# Patient Record
Sex: MALE | Race: WHITE | Employment: OTHER | URBAN - NONMETROPOLITAN AREA
[De-identification: names, ages, dates, MRNs, and addresses within clinical notes are randomized per-mention and may not be internally consistent; named-entity substitution may affect disease eponyms.]

---

## 2017-08-18 DIAGNOSIS — I10 ESSENTIAL HYPERTENSION: ICD-10-CM

## 2017-08-18 DIAGNOSIS — F32.A DEPRESSION, UNSPECIFIED DEPRESSION TYPE: ICD-10-CM

## 2017-08-18 RX ORDER — ENALAPRIL MALEATE 10 MG/1
10 TABLET ORAL 2 TIMES DAILY
Qty: 60 TABLET | Refills: 0 | Status: SHIPPED | OUTPATIENT
Start: 2017-08-18 | End: 2017-08-25 | Stop reason: SDUPTHER

## 2017-08-21 DIAGNOSIS — E03.9 HYPOTHYROIDISM, UNSPECIFIED TYPE: ICD-10-CM

## 2017-08-21 RX ORDER — LEVOTHYROXINE SODIUM 125 MCG
TABLET ORAL
Qty: 30 TABLET | OUTPATIENT
Start: 2017-08-21

## 2017-08-25 ENCOUNTER — OFFICE VISIT (OUTPATIENT)
Dept: FAMILY MEDICINE CLINIC | Age: 62
End: 2017-08-25
Payer: MEDICARE

## 2017-08-25 VITALS
WEIGHT: 238 LBS | DIASTOLIC BLOOD PRESSURE: 88 MMHG | RESPIRATION RATE: 14 BRPM | SYSTOLIC BLOOD PRESSURE: 138 MMHG | HEART RATE: 84 BPM | BODY MASS INDEX: 33.32 KG/M2 | HEIGHT: 71 IN

## 2017-08-25 DIAGNOSIS — I10 ESSENTIAL HYPERTENSION: ICD-10-CM

## 2017-08-25 DIAGNOSIS — Z00.00 ANNUAL PHYSICAL EXAM: Primary | ICD-10-CM

## 2017-08-25 DIAGNOSIS — R35.1 NOCTURIA: ICD-10-CM

## 2017-08-25 DIAGNOSIS — Z11.59 NEED FOR HEPATITIS C SCREENING TEST: ICD-10-CM

## 2017-08-25 DIAGNOSIS — Z23 NEED FOR PNEUMOCOCCAL VACCINATION: ICD-10-CM

## 2017-08-25 DIAGNOSIS — E03.9 HYPOTHYROIDISM, UNSPECIFIED TYPE: ICD-10-CM

## 2017-08-25 DIAGNOSIS — F32.A DEPRESSION, UNSPECIFIED DEPRESSION TYPE: ICD-10-CM

## 2017-08-25 PROCEDURE — G0009 ADMIN PNEUMOCOCCAL VACCINE: HCPCS | Performed by: FAMILY MEDICINE

## 2017-08-25 PROCEDURE — 99396 PREV VISIT EST AGE 40-64: CPT | Performed by: FAMILY MEDICINE

## 2017-08-25 PROCEDURE — 90732 PPSV23 VACC 2 YRS+ SUBQ/IM: CPT | Performed by: FAMILY MEDICINE

## 2017-08-25 RX ORDER — LEVOTHYROXINE SODIUM 0.12 MG/1
TABLET ORAL
Qty: 30 TABLET | Refills: 11 | Status: SHIPPED | OUTPATIENT
Start: 2017-08-25 | End: 2018-08-20 | Stop reason: SDUPTHER

## 2017-08-25 RX ORDER — ENALAPRIL MALEATE 10 MG/1
10 TABLET ORAL 2 TIMES DAILY
Qty: 60 TABLET | Refills: 11 | Status: SHIPPED | OUTPATIENT
Start: 2017-08-25 | End: 2018-08-27 | Stop reason: SDUPTHER

## 2017-08-25 RX ORDER — SPIRONOLACTONE 25 MG
TABLET ORAL
COMMUNITY

## 2017-08-25 RX ORDER — LACOSAMIDE 50 MG/1
50 TABLET ORAL 2 TIMES DAILY
COMMUNITY
End: 2018-08-27 | Stop reason: ALTCHOICE

## 2017-08-25 ASSESSMENT — PATIENT HEALTH QUESTIONNAIRE - PHQ9
2. FEELING DOWN, DEPRESSED OR HOPELESS: 1
SUM OF ALL RESPONSES TO PHQ9 QUESTIONS 1 & 2: 1
1. LITTLE INTEREST OR PLEASURE IN DOING THINGS: 0
SUM OF ALL RESPONSES TO PHQ QUESTIONS 1-9: 1

## 2017-08-25 ASSESSMENT — ENCOUNTER SYMPTOMS
EYE PAIN: 0
ABDOMINAL PAIN: 0
RHINORRHEA: 0
SORE THROAT: 0
CHEST TIGHTNESS: 0
COUGH: 0
BLOOD IN STOOL: 0
NAUSEA: 0
CONSTIPATION: 0
DIARRHEA: 0
BACK PAIN: 0
SHORTNESS OF BREATH: 0
WHEEZING: 0
VOMITING: 0

## 2017-08-30 ENCOUNTER — NURSE ONLY (OUTPATIENT)
Dept: FAMILY MEDICINE CLINIC | Age: 62
End: 2017-08-30
Payer: MEDICARE

## 2017-08-30 DIAGNOSIS — I10 ESSENTIAL HYPERTENSION: Primary | ICD-10-CM

## 2017-08-30 PROCEDURE — 36415 COLL VENOUS BLD VENIPUNCTURE: CPT | Performed by: FAMILY MEDICINE

## 2017-08-31 ENCOUNTER — TELEPHONE (OUTPATIENT)
Dept: FAMILY MEDICINE CLINIC | Age: 62
End: 2017-08-31

## 2017-08-31 LAB
ABSOLUTE BASO #: 0 K/UL (ref 0–0.1)
ABSOLUTE EOS #: 0.1 K/UL (ref 0.1–0.4)
ABSOLUTE LYMPH #: 1.7 K/UL (ref 0.8–5.2)
ABSOLUTE MONO #: 0.4 K/UL (ref 0.1–0.9)
ABSOLUTE NEUT #: 1.7 K/UL (ref 1.3–9.1)
ALBUMIN SERPL-MCNC: 4 G/DL (ref 3.2–5.3)
ALK PHOSPHATASE: 65 IU/L (ref 35–121)
ALT SERPL-CCNC: 19 IU/L (ref 5–59)
ANION GAP SERPL CALCULATED.3IONS-SCNC: 10 MMOL/L
AST SERPL-CCNC: 28 IU/L (ref 10–42)
BASOPHILS RELATIVE PERCENT: 0.5 %
BILIRUB SERPL-MCNC: 0.7 MG/DL (ref 0.2–1.3)
BUN BLDV-MCNC: 16 MG/DL (ref 10–20)
CALCIUM SERPL-MCNC: 9 MG/DL (ref 8.7–10.8)
CHLORIDE BLD-SCNC: 105 MMOL/L (ref 95–111)
CHOLESTEROL/HDL RATIO: 4.2
CHOLESTEROL: 101 MG/DL
CO2: 29 MMOL/L (ref 21–32)
CREAT SERPL-MCNC: 0.7 MG/DL (ref 0.5–1.3)
EGFR AFRICAN AMERICAN: 139
EGFR IF NONAFRICAN AMERICAN: 115
EOSINOPHILS RELATIVE PERCENT: 3.5 %
GLUCOSE: 86 MG/DL (ref 70–100)
HCT VFR BLD CALC: 41.8 % (ref 41.4–51)
HDLC SERPL-MCNC: 24 MG/DL (ref 40–60)
HEMOGLOBIN: 14.5 G/DL (ref 13.8–17)
HEPATITIS C ANTIBODY: NEGATIVE
LDL CHOLESTEROL CALCULATED: 13 MG/DL
LDL/HDL RATIO: 0.5
LYMPHOCYTE %: 43.4 %
MCH RBC QN AUTO: 28.9 PG (ref 27–34)
MCHC RBC AUTO-ENTMCNC: 34.7 G/DL (ref 31–36)
MCV RBC AUTO: 83.4 FL (ref 80–100)
MONOCYTES # BLD: 9 %
NEUTROPHILS RELATIVE PERCENT: 43.3 %
PDW BLD-RTO: 13.3 % (ref 10.8–14.8)
PLATELETS: 97 K/UL (ref 150–450)
POTASSIUM SERPL-SCNC: 3.7 MMOL/L (ref 3.5–5.4)
PSA, ULTRASENSITIVE: 0.32 NG/ML
RBC: 5.01 M/UL (ref 4–5.5)
SODIUM BLD-SCNC: 140 MMOL/L (ref 134–147)
T4 FREE: 1.02 NG/DL (ref 0.8–1.8)
TOTAL PROTEIN: 6.4 G/DL (ref 5.8–8)
TRIGL SERPL-MCNC: 318 MG/DL
TSH SERPL DL<=0.05 MIU/L-ACNC: <0.004 UIU/ML (ref 0.4–4.4)
VLDLC SERPL CALC-MCNC: 64 MG/DL
WBC: 4 K/UL (ref 3.7–10.8)

## 2018-04-05 ENCOUNTER — TELEPHONE (OUTPATIENT)
Dept: FAMILY MEDICINE CLINIC | Age: 63
End: 2018-04-05

## 2018-08-17 DIAGNOSIS — E03.9 HYPOTHYROIDISM, UNSPECIFIED TYPE: ICD-10-CM

## 2018-08-20 DIAGNOSIS — E03.9 HYPOTHYROIDISM, UNSPECIFIED TYPE: ICD-10-CM

## 2018-08-20 RX ORDER — LEVOTHYROXINE SODIUM 0.12 MG/1
TABLET ORAL
Qty: 30 TABLET | Refills: 0 | Status: SHIPPED | OUTPATIENT
Start: 2018-08-20 | End: 2018-08-27 | Stop reason: SDUPTHER

## 2018-08-20 RX ORDER — LEVOTHYROXINE SODIUM 125 MCG
TABLET ORAL
Qty: 30 TABLET | OUTPATIENT
Start: 2018-08-20

## 2018-08-20 NOTE — TELEPHONE ENCOUNTER
Patient's wife Arben Royal called to request a short term Rx for his Synthroid 125 mcg. He has an appt scheduled for his yearly check up and med refills on 8/27/18. He uses [facility].   Call her back at [phone] only if any problems

## 2018-08-27 ENCOUNTER — OFFICE VISIT (OUTPATIENT)
Dept: FAMILY MEDICINE CLINIC | Age: 63
End: 2018-08-27
Payer: MEDICARE

## 2018-08-27 VITALS
SYSTOLIC BLOOD PRESSURE: 138 MMHG | HEIGHT: 71 IN | RESPIRATION RATE: 12 BRPM | WEIGHT: 241.6 LBS | HEART RATE: 72 BPM | BODY MASS INDEX: 33.82 KG/M2 | DIASTOLIC BLOOD PRESSURE: 84 MMHG

## 2018-08-27 DIAGNOSIS — I10 ESSENTIAL HYPERTENSION: ICD-10-CM

## 2018-08-27 DIAGNOSIS — Z00.00 ANNUAL PHYSICAL EXAM: Primary | ICD-10-CM

## 2018-08-27 DIAGNOSIS — E03.9 HYPOTHYROIDISM, UNSPECIFIED TYPE: ICD-10-CM

## 2018-08-27 DIAGNOSIS — F32.A DEPRESSION, UNSPECIFIED DEPRESSION TYPE: ICD-10-CM

## 2018-08-27 DIAGNOSIS — Z83.49 FAMILY HISTORY OF HEMOCHROMATOSIS: ICD-10-CM

## 2018-08-27 DIAGNOSIS — R35.1 NOCTURIA: ICD-10-CM

## 2018-08-27 LAB
ALBUMIN SERPL-MCNC: 4 G/DL (ref 3.5–5.1)
ALP BLD-CCNC: 67 U/L (ref 38–126)
ALT SERPL-CCNC: 18 U/L (ref 11–66)
ANION GAP SERPL CALCULATED.3IONS-SCNC: 12 MEQ/L (ref 8–16)
AST SERPL-CCNC: 27 U/L (ref 5–40)
BASOPHILS # BLD: 0.5 %
BASOPHILS ABSOLUTE: 0 THOU/MM3 (ref 0–0.1)
BILIRUB SERPL-MCNC: 0.5 MG/DL (ref 0.3–1.2)
BUN BLDV-MCNC: 20 MG/DL (ref 7–22)
CALCIUM SERPL-MCNC: 9.1 MG/DL (ref 8.5–10.5)
CHLORIDE BLD-SCNC: 105 MEQ/L (ref 98–111)
CHOLESTEROL, TOTAL: 144 MG/DL (ref 100–199)
CO2: 25 MEQ/L (ref 23–33)
CREAT SERPL-MCNC: 0.7 MG/DL (ref 0.4–1.2)
EOSINOPHIL # BLD: 2 %
EOSINOPHILS ABSOLUTE: 0.1 THOU/MM3 (ref 0–0.4)
ERYTHROCYTE [DISTWIDTH] IN BLOOD BY AUTOMATED COUNT: 13 % (ref 11.5–14.5)
ERYTHROCYTE [DISTWIDTH] IN BLOOD BY AUTOMATED COUNT: 39 FL (ref 35–45)
FERRITIN: 309 NG/ML (ref 22–322)
GFR SERPL CREATININE-BSD FRML MDRD: > 90 ML/MIN/1.73M2
GLUCOSE BLD-MCNC: 101 MG/DL (ref 70–108)
HCT VFR BLD CALC: 42.4 % (ref 42–52)
HDLC SERPL-MCNC: 22 MG/DL
HEMOGLOBIN: 15.3 GM/DL (ref 14–18)
IMMATURE GRANS (ABS): 0.01 THOU/MM3 (ref 0–0.07)
IMMATURE GRANULOCYTES: 0.2 %
IRON: 62 UG/DL (ref 65–195)
LDL CHOLESTEROL CALCULATED: ABNORMAL MG/DL
LYMPHOCYTES # BLD: 42.5 %
LYMPHOCYTES ABSOLUTE: 1.7 THOU/MM3 (ref 1–4.8)
MCH RBC QN AUTO: 30.1 PG (ref 26–33)
MCHC RBC AUTO-ENTMCNC: 36.1 GM/DL (ref 32.2–35.5)
MCV RBC AUTO: 83.5 FL (ref 80–94)
MONOCYTES # BLD: 8.6 %
MONOCYTES ABSOLUTE: 0.4 THOU/MM3 (ref 0.4–1.3)
NUCLEATED RED BLOOD CELLS: 0 /100 WBC
PLATELET # BLD: 104 THOU/MM3 (ref 130–400)
PMV BLD AUTO: 11.6 FL (ref 9.4–12.4)
POTASSIUM SERPL-SCNC: 4 MEQ/L (ref 3.5–5.2)
PROSTATE SPECIFIC ANTIGEN: 0.35 NG/ML (ref 0–1)
RBC # BLD: 5.08 MILL/MM3 (ref 4.7–6.1)
SEG NEUTROPHILS: 46.2 %
SEGMENTED NEUTROPHILS ABSOLUTE COUNT: 1.9 THOU/MM3 (ref 1.8–7.7)
SODIUM BLD-SCNC: 142 MEQ/L (ref 135–145)
T4 FREE: 0.79 NG/DL (ref 0.93–1.76)
TOTAL IRON BINDING CAPACITY: 210 UG/DL (ref 171–450)
TOTAL PROTEIN: 7.1 G/DL (ref 6.1–8)
TRIGL SERPL-MCNC: 803 MG/DL (ref 0–199)
TSH SERPL DL<=0.05 MIU/L-ACNC: < 0.005 UIU/ML (ref 0.4–4.2)
WBC # BLD: 4.1 THOU/MM3 (ref 4.8–10.8)

## 2018-08-27 PROCEDURE — 36415 COLL VENOUS BLD VENIPUNCTURE: CPT | Performed by: FAMILY MEDICINE

## 2018-08-27 PROCEDURE — 99396 PREV VISIT EST AGE 40-64: CPT | Performed by: FAMILY MEDICINE

## 2018-08-27 RX ORDER — LEVOTHYROXINE SODIUM 0.12 MG/1
TABLET ORAL
Qty: 30 TABLET | Refills: 11 | Status: SHIPPED | OUTPATIENT
Start: 2018-08-27 | End: 2019-09-11 | Stop reason: SDUPTHER

## 2018-08-27 RX ORDER — OXCARBAZEPINE 300 MG/1
300 TABLET, FILM COATED ORAL 2 TIMES DAILY
COMMUNITY

## 2018-08-27 RX ORDER — MAGNESIUM OXIDE 400 MG/1
400 TABLET ORAL DAILY
COMMUNITY
End: 2022-09-08 | Stop reason: ALTCHOICE

## 2018-08-27 RX ORDER — ENALAPRIL MALEATE 10 MG/1
10 TABLET ORAL 2 TIMES DAILY
Qty: 60 TABLET | Refills: 11 | Status: SHIPPED | OUTPATIENT
Start: 2018-08-27 | End: 2019-09-11 | Stop reason: SDUPTHER

## 2018-08-27 ASSESSMENT — ENCOUNTER SYMPTOMS
COUGH: 0
VOMITING: 0
SHORTNESS OF BREATH: 0
EYE PAIN: 0
BLOOD IN STOOL: 0
BACK PAIN: 0
ABDOMINAL PAIN: 0
CONSTIPATION: 0
WHEEZING: 0
NAUSEA: 0
DIARRHEA: 0
CHEST TIGHTNESS: 0
SORE THROAT: 0
RHINORRHEA: 0

## 2018-08-27 ASSESSMENT — PATIENT HEALTH QUESTIONNAIRE - PHQ9
SUM OF ALL RESPONSES TO PHQ QUESTIONS 1-9: 0
SUM OF ALL RESPONSES TO PHQ QUESTIONS 1-9: 0
SUM OF ALL RESPONSES TO PHQ9 QUESTIONS 1 & 2: 0
2. FEELING DOWN, DEPRESSED OR HOPELESS: 0
1. LITTLE INTEREST OR PLEASURE IN DOING THINGS: 0

## 2018-08-27 NOTE — PROGRESS NOTES
no rebound and no guarding. Musculoskeletal: Normal range of motion. Right lower leg: He exhibits edema. Left lower leg: He exhibits edema. Lymphadenopathy:     He has no cervical adenopathy. Neurological: He is alert and oriented to person, place, and time. He has normal reflexes. No cranial nerve deficit. Skin: Skin is warm and dry. No rash noted. Psychiatric: He has a normal mood and affect. Nursing note and vitals reviewed.     Health Maintenance   Topic Date Due    HIV screen  09/10/1970    Shingles Vaccine (1 of 2 - 2 Dose Series) 04/16/2016    TSH testing  08/30/2018    Potassium monitoring  08/30/2018    Creatinine monitoring  08/30/2018    Flu vaccine (1) 09/01/2018    Colon cancer screen colonoscopy  12/03/2020    Lipid screen  08/30/2022    Hepatitis C screen  Completed     Lab Results   Component Value Date    CHOL 101 08/30/2017    CHOL 97 08/18/2016    CHOL 97 08/18/2016     Lab Results   Component Value Date    TRIG 318 (H) 08/30/2017    TRIG 331 (H) 08/18/2016    TRIG 331 08/18/2016     Lab Results   Component Value Date    HDL 24 (L) 08/30/2017    HDL 22 (L) 08/18/2016    HDL 22 (A) 08/18/2016     Lab Results   Component Value Date    LDLCALC 13 08/30/2017    LDLCALC 9 08/18/2016    LDLCALC 9 08/18/2016     Lab Results   Component Value Date    LABVLDL 64 (H) 08/30/2017    LABVLDL 66 (H) 08/18/2016    LABVLDL 74 (H) 11/12/2014    VLDL 66 08/18/2016    VLDL 74 11/12/2014     Lab Results   Component Value Date    CHOLHDLRATIO 4.2 08/30/2017    CHOLHDLRATIO 4.4 08/18/2016    CHOLHDLRATIO 4.4 08/18/2016     Lab Results   Component Value Date     08/30/2017    K 3.7 08/30/2017     08/30/2017    CO2 29 08/30/2017    BUN 16 08/30/2017    CREATININE 0.7 08/30/2017    GLUCOSE 86 08/30/2017    CALCIUM 9.0 08/30/2017    PROT 6.4 08/30/2017    LABALBU 4.0 08/30/2017    BILITOT 0.7 08/30/2017    ALKPHOS 65 08/30/2017    AST 28 08/30/2017    ALT 19 08/30/2017 Assessment:      Annual physical exam      Plan:      Refill meds  Fasting blood work  Encouraged diet, exercise and weight loss. Dash diet  Reviewed health maintenance    Ernie Martinez received counseling on the following healthy behaviors: nutrition, exercise and medication adherence    Patient given educational materials on Nutrition, Exercise and Hypertension    I have instructed Ernie Martinez to complete a self tracking handout on Blood Pressures  and Weights and instructed them to bring it with them to his next appointment. Discussed use, benefit, and side effects of prescribed medications. Barriers to medication compliance addressed. All patient questions answered. Pt voiced understanding.              Tong Suarez MD

## 2018-08-27 NOTE — PATIENT INSTRUCTIONS
heart attack and stroke. · Blood pressure. Have your blood pressure checked during a routine doctor visit. Your doctor will tell you how often to check your blood pressure based on your age, your blood pressure results, and other factors. · Prostate exam. Talk to your doctor about whether you should have a blood test (called a PSA test) for prostate cancer. Experts disagree on whether men should have this test. Some experts recommend that you discuss the benefits and risks of the test with your doctor. · Diabetes. Ask your doctor whether you should have tests for diabetes. · Vision. Some experts recommend that you have yearly exams for glaucoma and other age-related eye problems starting at age 48. · Hearing. Tell your doctor if you notice any change in your hearing. You can have tests to find out how well you hear. · Colon cancer. You should begin tests for colon cancer at age 48. You may have one of several tests. Your doctor will tell you how often to have tests based on your age and risk. Risks include whether you already had a precancerous polyp removed from your colon or whether your parent, brother, sister, or child has had colon cancer. · Heart attack and stroke risk. At least every 4 to 6 years, you should have your risk for heart attack and stroke assessed. Your doctor uses factors such as your age, blood pressure, cholesterol, and whether you smoke or have diabetes to show what your risk for a heart attack or stroke is over the next 10 years. · Abdominal aortic aneurysm. Ask your doctor whether you should have a test to check for an aneurysm. You may need a test if you ever smoked or if your parent, brother, sister, or child has had an aneurysm. When should you call for help? Watch closely for changes in your health, and be sure to contact your doctor if you have any problems or symptoms that concern you. Where can you learn more? Go to https://selena.health-partners. org and sign in to your [employer] account. Enter Y722 in the [facility] box to learn more about \"Well Visit, Men 48 to 72: Care Instructions. \"     If you do not have an account, please click on the \"Sign Up Now\" link. Current as of: Yue 10, 2017  Content Version: 11.7  © 0868-7226 [employer]. Care instructions adapted under license by [facility] ([facility]). If you have questions about a medical condition or this instruction, always ask your healthcare professional. [place] [address] any warranty or liability for your use of this information. Patient Education        Hypothyroidism: Care Instructions  Your Care Instructions    You have hypothyroidism, which means that your body is not making enough thyroid hormone. This hormone helps your body use energy. If your thyroid level is low, you may feel tired, be constipated, have an increase in your blood pressure, or have dry skin or memory problems. You may also get cold easily, even when it is warm. Women with low thyroid levels may have heavy menstrual periods. A blood test to find your thyroid-stimulating hormone (TSH) level is used to check for hypothyroidism. A high TSH level may mean that you have low thyroid. When your body is not making enough thyroid hormone, TSH levels rise in an effort to make the body produce more. The treatment for hypothyroidism is to take thyroid hormone pills. You should start to feel better in 1 to 2 weeks. But it can take several months to see changes in the TSH level. You will need regular visits with your doctor to make sure you have the right dose of medicine. Most people need treatment for the rest of their lives. You will need to see your doctor regularly to have blood tests and to make sure you are doing well. Follow-up care is a key part of your treatment and safety. Be sure to make and go to all appointments, and call your doctor if you are having problems.  It's also a good idea to know your test questions about a medical condition or this instruction, always ask your healthcare professional. [address] any warranty or liability for your use of this information. Patient Education        High Blood Pressure: Care Instructions  Your Care Instructions    If your blood pressure is usually above 130/80, you have high blood pressure, or hypertension. That means the top number is 130 or higher or the bottom number is 80 or higher, or both. Despite what a lot of people think, high blood pressure usually doesn't cause headaches or make you feel dizzy or lightheaded. It usually has no symptoms. But it does increase your risk for heart attack, stroke, and kidney or eye damage. The higher your blood pressure, the more your risk increases. Your doctor will give you a goal for your blood pressure. Your goal will be based on your health and your age. Lifestyle changes, such as eating healthy and being active, are always important to help lower blood pressure. You might also take medicine to reach your blood pressure goal.  Follow-up care is a key part of your treatment and safety. Be sure to make and go to all appointments, and call your doctor if you are having problems. It's also a good idea to know your test results and keep a list of the medicines you take. How can you care for yourself at home? Medical treatment  · If you stop taking your medicine, your blood pressure will go back up. You may take one or more types of medicine to lower your blood pressure. Be safe with medicines. Take your medicine exactly as prescribed. Call your doctor if you think you are having a problem with your medicine. · Talk to your doctor before you start taking aspirin every day. Aspirin can help certain people lower their risk of a heart attack or stroke. But taking aspirin isn't right for everyone, because it can cause serious bleeding. · See your doctor regularly.  You may need to see the doctor more often at first or until your blood pressure comes down. · If you are taking blood pressure medicine, talk to your doctor before you take decongestants or anti-inflammatory medicine, such as ibuprofen. Some of these medicines can raise blood pressure. · Learn how to check your blood pressure at home. Lifestyle changes  · Stay at a healthy weight. This is especially important if you put on weight around the waist. Losing even 10 pounds can help you lower your blood pressure. · If your doctor recommends it, get more exercise. Walking is a good choice. Bit by bit, increase the amount you walk every day. Try for at least 30 minutes on most days of the week. You also may want to swim, bike, or do other activities. · Avoid or limit alcohol. Talk to your doctor about whether you can drink any alcohol. · Try to limit how much sodium you eat to less than 2,300 milligrams (mg) a day. Your doctor may ask you to try to eat less than 1,500 mg a day. · Eat plenty of fruits (such as bananas and oranges), vegetables, legumes, whole grains, and low-fat dairy products. · Lower the amount of saturated fat in your diet. Saturated fat is found in animal products such as milk, cheese, and meat. Limiting these foods may help you lose weight and also lower your risk for heart disease. · Do not smoke. Smoking increases your risk for heart attack and stroke. If you need help quitting, talk to your doctor about stop-smoking programs and medicines. These can increase your chances of quitting for good. When should you call for help? Call 911 anytime you think you may need emergency care. This may mean having symptoms that suggest that your blood pressure is causing a serious heart or blood vessel problem. Your blood pressure may be over 180/110.   For example, call 911 if:    · You have symptoms of a heart attack. These may include:  ¨ Chest pain or pressure, or a strange feeling in the chest.  ¨ Sweating.   ¨ Shortness of breath. ¨ Nausea or vomiting. ¨ Pain, pressure, or a strange feeling in the back, neck, jaw, or upper belly or in one or both shoulders or arms. ¨ Lightheadedness or sudden weakness. ¨ A fast or irregular heartbeat.     · You have symptoms of a stroke. These may include:  ¨ Sudden numbness, tingling, weakness, or loss of movement in your face, arm, or leg, especially on only one side of your body. ¨ Sudden vision changes. ¨ Sudden trouble speaking. ¨ Sudden confusion or trouble understanding simple statements. ¨ Sudden problems with walking or balance. ¨ A sudden, severe headache that is different from past headaches.     · You have severe back or belly pain.    Do not wait until your blood pressure comes down on its own. Get help right away.   Call your doctor now or seek immediate care if:    · Your blood pressure is much higher than normal (such as 180/110 or higher), but you don't have symptoms.     · You think high blood pressure is causing symptoms, such as:  ¨ Severe headache. ¨ Blurry vision.    Watch closely for changes in your health, and be sure to contact your doctor if:    · Your blood pressure measures 140/90 or higher at least 2 times. That means the top number is 140 or higher or the bottom number is 90 or higher, or both.     · You think you may be having side effects from your blood pressure medicine.     · Your blood pressure is usually normal, but it goes above normal at least 2 times. Where can you learn more? Go to https://WiOffer.Dog Digital. org and sign in to your [employer] account. Enter E451 in the [employer]Mama box to learn more about \"High Blood Pressure: Care Instructions. \"     If you do not have an account, please click on the \"Sign Up Now\" link. Current as of: December 6, 2017  Content Version: 11.7  © 7267-6878 [employer]. Care instructions adapted under license by [facility] ([facility]).  If you have questions about a medical condition or this instruction, always ask your healthcare professional. [address] any warranty or liability for your use of this information. Patient Education        DASH Diet: Care Instructions  Your Care Instructions    The DASH diet is an eating plan that can help lower your blood pressure. DASH stands for Dietary Approaches to Stop Hypertension. Hypertension is high blood pressure. The DASH diet focuses on eating foods that are high in calcium, potassium, and magnesium. These nutrients can lower blood pressure. The foods that are highest in these nutrients are fruits, vegetables, low-fat dairy products, nuts, seeds, and legumes. But taking calcium, potassium, and magnesium supplements instead of eating foods that are high in those nutrients does not have the same effect. The DASH diet also includes whole grains, fish, and poultry. The DASH diet is one of several lifestyle changes your doctor may recommend to lower your high blood pressure. Your doctor may also want you to decrease the amount of sodium in your diet. Lowering sodium while following the DASH diet can lower blood pressure even further than just the DASH diet alone. Follow-up care is a key part of your treatment and safety. Be sure to make and go to all appointments, and call your doctor if you are having problems. It's also a good idea to know your test results and keep a list of the medicines you take. How can you care for yourself at home? Following the DASH diet  · Eat 4 to 5 servings of fruit each day. A serving is 1 medium-sized piece of fruit, ½ cup chopped or canned fruit, 1/4 cup dried fruit, or 4 ounces (½ cup) of fruit juice. Choose fruit more often than fruit juice. · Eat 4 to 5 servings of vegetables each day. A serving is 1 cup of lettuce or raw leafy vegetables, ½ cup of chopped or cooked vegetables, or 4 ounces (½ cup) of vegetable juice. Choose vegetables more often than vegetable juice.   · Get 2 to 3 servings of

## 2018-08-28 ENCOUNTER — TELEPHONE (OUTPATIENT)
Dept: FAMILY MEDICINE CLINIC | Age: 63
End: 2018-08-28

## 2018-08-28 DIAGNOSIS — E78.1 HYPERTRIGLYCERIDEMIA: Primary | ICD-10-CM

## 2018-08-28 NOTE — TELEPHONE ENCOUNTER
Pt and pt's wife notified of results and states he missed 4-5 doses of thyroid medication. Wife wants to discuss going on trig medication and will contact the office. She would like to  iron studies. Studies printed, release form attached - will come to the office to .

## 2018-09-07 RX ORDER — FENOFIBRATE 160 MG/1
160 TABLET ORAL DAILY
Qty: 30 TABLET | Refills: 3 | Status: SHIPPED | OUTPATIENT
Start: 2018-09-07 | End: 2018-11-15 | Stop reason: SDUPTHER

## 2018-11-14 ENCOUNTER — NURSE ONLY (OUTPATIENT)
Dept: FAMILY MEDICINE CLINIC | Age: 63
End: 2018-11-14
Payer: MEDICARE

## 2018-11-14 DIAGNOSIS — E78.1 HYPERTRIGLYCERIDEMIA: ICD-10-CM

## 2018-11-14 LAB
AST SERPL-CCNC: 31 U/L (ref 5–40)
TRIGL SERPL-MCNC: 160 MG/DL (ref 0–199)

## 2018-11-14 PROCEDURE — 36415 COLL VENOUS BLD VENIPUNCTURE: CPT | Performed by: FAMILY MEDICINE

## 2018-11-15 ENCOUNTER — TELEPHONE (OUTPATIENT)
Dept: FAMILY MEDICINE CLINIC | Age: 63
End: 2018-11-15

## 2018-11-15 DIAGNOSIS — E78.1 HYPERTRIGLYCERIDEMIA: ICD-10-CM

## 2018-11-15 RX ORDER — FENOFIBRATE 160 MG/1
160 TABLET ORAL DAILY
Qty: 30 TABLET | Refills: 9 | Status: SHIPPED | OUTPATIENT
Start: 2018-11-15 | End: 2019-09-11 | Stop reason: SDUPTHER

## 2019-09-11 ENCOUNTER — OFFICE VISIT (OUTPATIENT)
Dept: FAMILY MEDICINE CLINIC | Age: 64
End: 2019-09-11
Payer: MEDICARE

## 2019-09-11 VITALS
WEIGHT: 241 LBS | DIASTOLIC BLOOD PRESSURE: 80 MMHG | RESPIRATION RATE: 14 BRPM | HEART RATE: 88 BPM | HEIGHT: 70 IN | BODY MASS INDEX: 34.5 KG/M2 | SYSTOLIC BLOOD PRESSURE: 138 MMHG

## 2019-09-11 DIAGNOSIS — E78.1 HYPERTRIGLYCERIDEMIA: ICD-10-CM

## 2019-09-11 DIAGNOSIS — D64.9 ANEMIA, UNSPECIFIED TYPE: ICD-10-CM

## 2019-09-11 DIAGNOSIS — F32.A DEPRESSION, UNSPECIFIED DEPRESSION TYPE: ICD-10-CM

## 2019-09-11 DIAGNOSIS — R35.1 NOCTURIA: ICD-10-CM

## 2019-09-11 DIAGNOSIS — I10 ESSENTIAL HYPERTENSION: ICD-10-CM

## 2019-09-11 DIAGNOSIS — E03.9 HYPOTHYROIDISM, UNSPECIFIED TYPE: ICD-10-CM

## 2019-09-11 DIAGNOSIS — Z00.00 ANNUAL PHYSICAL EXAM: Primary | ICD-10-CM

## 2019-09-11 LAB
ALBUMIN SERPL-MCNC: 4.2 G/DL (ref 3.5–5.1)
ALP BLD-CCNC: 38 U/L (ref 38–126)
ALT SERPL-CCNC: 15 U/L (ref 11–66)
ANION GAP SERPL CALCULATED.3IONS-SCNC: 9 MEQ/L (ref 8–16)
AST SERPL-CCNC: 25 U/L (ref 5–40)
BASOPHILS # BLD: 0.7 %
BASOPHILS ABSOLUTE: 0 THOU/MM3 (ref 0–0.1)
BILIRUB SERPL-MCNC: 0.5 MG/DL (ref 0.3–1.2)
BUN BLDV-MCNC: 23 MG/DL (ref 7–22)
CALCIUM SERPL-MCNC: 9.4 MG/DL (ref 8.5–10.5)
CHLORIDE BLD-SCNC: 101 MEQ/L (ref 98–111)
CHOLESTEROL, TOTAL: 112 MG/DL (ref 100–199)
CO2: 28 MEQ/L (ref 23–33)
CREAT SERPL-MCNC: 0.8 MG/DL (ref 0.4–1.2)
EOSINOPHIL # BLD: 3.6 %
EOSINOPHILS ABSOLUTE: 0.1 THOU/MM3 (ref 0–0.4)
ERYTHROCYTE [DISTWIDTH] IN BLOOD BY AUTOMATED COUNT: 12.6 % (ref 11.5–14.5)
ERYTHROCYTE [DISTWIDTH] IN BLOOD BY AUTOMATED COUNT: 38.6 FL (ref 35–45)
GFR SERPL CREATININE-BSD FRML MDRD: > 90 ML/MIN/1.73M2
GLUCOSE BLD-MCNC: 94 MG/DL (ref 70–108)
HCT VFR BLD CALC: 39.6 % (ref 42–52)
HDLC SERPL-MCNC: 24 MG/DL
HEMOGLOBIN: 13.9 GM/DL (ref 14–18)
IMMATURE GRANS (ABS): 0.01 THOU/MM3 (ref 0–0.07)
IMMATURE GRANULOCYTES: 0 %
IRON: 96 UG/DL (ref 65–195)
LDL CHOLESTEROL CALCULATED: 56 MG/DL
LYMPHOCYTES # BLD: 41.4 %
LYMPHOCYTES ABSOLUTE: 1.3 THOU/MM3 (ref 1–4.8)
MCH RBC QN AUTO: 29.8 PG (ref 26–33)
MCHC RBC AUTO-ENTMCNC: 35.1 GM/DL (ref 32.2–35.5)
MCV RBC AUTO: 85 FL (ref 80–94)
MONOCYTES # BLD: 9.8 %
MONOCYTES ABSOLUTE: 0.3 THOU/MM3 (ref 0.4–1.3)
NUCLEATED RED BLOOD CELLS: 0 /100 WBC
PLATELET # BLD: 101 THOU/MM3 (ref 130–400)
PMV BLD AUTO: 11 FL (ref 9.4–12.4)
POTASSIUM SERPL-SCNC: 4 MEQ/L (ref 3.5–5.2)
PROSTATE SPECIFIC ANTIGEN: 0.64 NG/ML (ref 0–1)
RBC # BLD: 4.66 MILL/MM3 (ref 4.7–6.1)
SEG NEUTROPHILS: 44.2 %
SEGMENTED NEUTROPHILS ABSOLUTE COUNT: 1.4 THOU/MM3 (ref 1.8–7.7)
SODIUM BLD-SCNC: 138 MEQ/L (ref 135–145)
T4 FREE: 1.28 NG/DL (ref 0.93–1.76)
TOTAL PROTEIN: 7 G/DL (ref 6.1–8)
TRIGL SERPL-MCNC: 159 MG/DL (ref 0–199)
TSH SERPL DL<=0.05 MIU/L-ACNC: < 0.005 UIU/ML (ref 0.4–4.2)
WBC # BLD: 3.1 THOU/MM3 (ref 4.8–10.8)

## 2019-09-11 PROCEDURE — 99396 PREV VISIT EST AGE 40-64: CPT | Performed by: FAMILY MEDICINE

## 2019-09-11 PROCEDURE — 36415 COLL VENOUS BLD VENIPUNCTURE: CPT | Performed by: FAMILY MEDICINE

## 2019-09-11 RX ORDER — ENALAPRIL MALEATE 10 MG/1
10 TABLET ORAL 2 TIMES DAILY
Qty: 60 TABLET | Refills: 11 | Status: SHIPPED | OUTPATIENT
Start: 2019-09-11 | End: 2020-09-16 | Stop reason: SDUPTHER

## 2019-09-11 RX ORDER — LEVOTHYROXINE SODIUM 0.12 MG/1
TABLET ORAL
Qty: 30 TABLET | Refills: 11 | Status: SHIPPED | OUTPATIENT
Start: 2019-09-11 | End: 2020-09-16 | Stop reason: SDUPTHER

## 2019-09-11 RX ORDER — FENOFIBRATE 160 MG/1
160 TABLET ORAL DAILY
Qty: 30 TABLET | Refills: 11 | Status: SHIPPED | OUTPATIENT
Start: 2019-09-11 | End: 2020-09-16 | Stop reason: SDUPTHER

## 2019-09-11 ASSESSMENT — ENCOUNTER SYMPTOMS
DIARRHEA: 0
ABDOMINAL PAIN: 0
CONSTIPATION: 0
SHORTNESS OF BREATH: 0
SORE THROAT: 0
EYE PAIN: 0
WHEEZING: 0
CHEST TIGHTNESS: 0
VOMITING: 0
BLOOD IN STOOL: 0
BACK PAIN: 0
NAUSEA: 0
RHINORRHEA: 0
COUGH: 0

## 2019-09-11 NOTE — PATIENT INSTRUCTIONS
Instructions. \"     If you do not have an account, please click on the \"Sign Up Now\" link. Current as of: December 13, 2018  Content Version: 12.1  © 0470-8810 [employer]. Care instructions adapted under license by [facility] ([facility]). If you have questions about a medical condition or this instruction, always ask your healthcare professional. [address] any warranty or liability for your use of this information. Patient Education        Hypothyroidism: Care Instructions  Your Care Instructions    You have hypothyroidism, which means that your body is not making enough thyroid hormone. This hormone helps your body use energy. If your thyroid level is low, you may feel tired, be constipated, have an increase in your blood pressure, or have dry skin or memory problems. You may also get cold easily, even when it is warm. Women with low thyroid levels may have heavy menstrual periods. A blood test to find your thyroid-stimulating hormone (TSH) level is used to check for hypothyroidism. A high TSH level may mean that you have low thyroid. When your body is not making enough thyroid hormone, TSH levels rise in an effort to make the body produce more. The treatment for hypothyroidism is to take thyroid hormone pills. You should start to feel better in 1 to 2 weeks. But it can take several months to see changes in the TSH level. You will need regular visits with your doctor to make sure you have the right dose of medicine. Most people need treatment for the rest of their lives. You will need to see your doctor regularly to have blood tests and to make sure you are doing well. Follow-up care is a key part of your treatment and safety. Be sure to make and go to all appointments, and call your doctor if you are having problems. It's also a good idea to know your test results and keep a list of the medicines you take. How can you care for yourself at home?   · Take your thyroid hormone or both shoulders or arms. ? Lightheadedness or sudden weakness. ? A fast or irregular heartbeat.     · You have symptoms of a stroke. These may include:  ? Sudden numbness, tingling, weakness, or loss of movement in your face, arm, or leg, especially on only one side of your body. ? Sudden vision changes. ? Sudden trouble speaking. ? Sudden confusion or trouble understanding simple statements. ? Sudden problems with walking or balance. ? A sudden, severe headache that is different from past headaches.     · You have severe back or belly pain.    Do not wait until your blood pressure comes down on its own. Get help right away.   Call your doctor now or seek immediate care if:    · Your blood pressure is much higher than normal (such as 180/120 or higher), but you don't have symptoms.     · You think high blood pressure is causing symptoms, such as:  ? Severe headache.  ? Blurry vision.    Watch closely for changes in your health, and be sure to contact your doctor if:    · Your blood pressure measures higher than your doctor recommends at least 2 times. That means the top number is higher or the bottom number is higher, or both.     · You think you may be having side effects from your blood pressure medicine. Where can you learn more? Go to https://BluedpeHughes Telematics.Official [employer] Virtual. org and sign in to your [employer] account. Enter R619 in the Ring box to learn more about \"High Blood Pressure: Care Instructions. \"     If you do not have an account, please click on the \"Sign Up Now\" link. Current as of: July 22, 2018  Content Version: 12.1  © 4887-9827 [employer], [employer]. Care instructions adapted under license by [facility] [address] If you have questions about a medical condition or this instruction, always ask your healthcare professional. [place] [address] any warranty or liability for your use of this information.          Patient Education        High Cholesterol: Care Instructions  Your Care Instructions    Cholesterol is a type of fat in your blood. It is needed for many body functions, such as making new cells. Cholesterol is made by your body. It also comes from food you eat. High cholesterol means that you have too much of the fat in your blood. This raises your risk of a heart attack and stroke. LDL and HDL are part of your total cholesterol. LDL is the \"bad\" cholesterol. High LDL can raise your risk for heart disease, heart attack, and stroke. HDL is the \"good\" cholesterol. It helps clear bad cholesterol from the body. High HDL is linked with a lower risk of heart disease, heart attack, and stroke. Your cholesterol levels help your doctor find out your risk for having a heart attack or stroke. You and your doctor can talk about whether you need to lower your risk and what treatment is best for you. A heart-healthy lifestyle along with medicines can help lower your cholesterol and your risk. The way you choose to lower your risk will depend on how high your risk is for heart attack and stroke. It will also depend on how you feel about taking medicines. Follow-up care is a key part of your treatment and safety. Be sure to make and go to all appointments, and call your doctor if you are having problems. It's also a good idea to know your test results and keep a list of the medicines you take. How can you care for yourself at home? · Eat a variety of foods every day. Good choices include fruits, vegetables, whole grains (like oatmeal), dried beans and peas, nuts and seeds, soy products (like tofu), and fat-free or low-fat dairy products. · Replace butter, margarine, and hydrogenated or partially hydrogenated oils with olive and canola oils. (Canola oil margarine without trans fat is fine.)  · Replace red meat with fish, poultry, and soy protein (like tofu). · Limit processed and packaged foods like chips, crackers, and cookies. · Bake, broil, or steam foods.  Don't mares nutrients does not have the same effect. The DASH diet also includes whole grains, fish, and poultry. The DASH diet is one of several lifestyle changes your doctor may recommend to lower your high blood pressure. Your doctor may also want you to decrease the amount of sodium in your diet. Lowering sodium while following the DASH diet can lower blood pressure even further than just the DASH diet alone. Follow-up care is a key part of your treatment and safety. Be sure to make and go to all appointments, and call your doctor if you are having problems. It's also a good idea to know your test results and keep a list of the medicines you take. How can you care for yourself at home? Following the DASH diet  · Eat 4 to 5 servings of fruit each day. A serving is 1 medium-sized piece of fruit, ½ cup chopped or canned fruit, 1/4 cup dried fruit, or 4 ounces (½ cup) of fruit juice. Choose fruit more often than fruit juice. · Eat 4 to 5 servings of vegetables each day. A serving is 1 cup of lettuce or raw leafy vegetables, ½ cup of chopped or cooked vegetables, or 4 ounces (½ cup) of vegetable juice. Choose vegetables more often than vegetable juice. · Get 2 to 3 servings of low-fat and fat-free dairy each day. A serving is 8 ounces of milk, 1 cup of yogurt, or 1 ½ ounces of cheese. · Eat 6 to 8 servings of grains each day. A serving is 1 slice of bread, 1 ounce of dry cereal, or ½ cup of cooked rice, pasta, or cooked cereal. Try to choose whole-grain products as much as possible. · Limit lean meat, poultry, and fish to 2 servings each day. A serving is 3 ounces, about the size of a deck of cards. · Eat 4 to 5 servings of nuts, seeds, and legumes (cooked dried beans, lentils, and split peas) each week. A serving is 1/3 cup of nuts, 2 tablespoons of seeds, or ½ cup of cooked beans or peas. · Limit fats and oils to 2 to 3 servings each day.  A serving is 1 teaspoon of vegetable oil or 2 tablespoons of salad medical condition or this instruction, always ask your healthcare professional. [place] [address] any warranty or liability for your use of this information.

## 2019-09-11 NOTE — PROGRESS NOTES
Blood work drawn today in the office, venous puncture, right arm, pt tolerated well.
ALKPHOS 67 08/27/2018    AST 31 11/14/2018    ALT 18 08/27/2018    LABGLOM >90 08/27/2018           Assessment:      Annual physical exam      Plan:      Refill meds  Fasting blood work  Encouraged diet, exercise and weight loss. Dash diet  Reviewed health maintenance    Lucio Self received counseling on the following healthy behaviors: nutrition, exercise and hypertension    Patient given educational materials on Nutrition, Exercise and Hypertension    I have instructed Lucio Self to complete a self tracking handout on Blood Pressures  and Weights and instructed them to bring it with them to his next appointment. Discussed use, benefit, and side effects of prescribed medications. Barriers to medication compliance addressed. All patient questions answered. Pt voiced understanding.            Alida Lazaro MD

## 2019-09-12 ENCOUNTER — TELEPHONE (OUTPATIENT)
Dept: FAMILY MEDICINE CLINIC | Age: 64
End: 2019-09-12

## 2020-01-23 ENCOUNTER — HOSPITAL ENCOUNTER (OUTPATIENT)
Age: 65
Discharge: HOME | End: 2020-01-23
Payer: MEDICARE

## 2020-01-23 ENCOUNTER — ANESTHESIA EVENT (OUTPATIENT)
Dept: OPERATING ROOM | Age: 65
End: 2020-01-23
Payer: MEDICARE

## 2020-01-23 LAB
ANION GAP SERPL CALCULATED.3IONS-SCNC: 12 MEQ/L (ref 8–16)
BASOPHILS # BLD: 0.4 %
BASOPHILS ABSOLUTE: 0 THOU/MM3 (ref 0–0.1)
BILIRUBIN URINE: ABNORMAL
BLOOD, URINE: NEGATIVE
BUN BLDV-MCNC: 32 MG/DL (ref 7–22)
CALCIUM SERPL-MCNC: 9.5 MG/DL (ref 8.5–10.5)
CHARACTER, URINE: CLEAR
CHLORIDE BLD-SCNC: 103 MEQ/L (ref 98–111)
CO2: 27 MEQ/L (ref 23–33)
COLOR: ABNORMAL
CREAT SERPL-MCNC: 1 MG/DL (ref 0.4–1.2)
EKG ATRIAL RATE: 79 BPM
EKG P AXIS: -9 DEGREES
EKG P-R INTERVAL: 174 MS
EKG Q-T INTERVAL: 380 MS
EKG QRS DURATION: 102 MS
EKG QTC CALCULATION (BAZETT): 435 MS
EKG R AXIS: -11 DEGREES
EKG T AXIS: 4 DEGREES
EKG VENTRICULAR RATE: 79 BPM
EOSINOPHIL # BLD: 2 %
EOSINOPHILS ABSOLUTE: 0.1 THOU/MM3 (ref 0–0.4)
ERYTHROCYTE [DISTWIDTH] IN BLOOD BY AUTOMATED COUNT: 13.1 % (ref 11.5–14.5)
ERYTHROCYTE [DISTWIDTH] IN BLOOD BY AUTOMATED COUNT: 42.2 FL (ref 35–45)
GFR SERPL CREATININE-BSD FRML MDRD: 75 ML/MIN/1.73M2
GLUCOSE BLD-MCNC: 93 MG/DL (ref 70–108)
GLUCOSE URINE: NEGATIVE MG/DL
HCT VFR BLD CALC: 41.5 % (ref 42–52)
HEMOGLOBIN: 14.9 GM/DL (ref 14–18)
ICTOTEST: NEGATIVE
IMMATURE GRANS (ABS): 0.01 THOU/MM3 (ref 0–0.07)
IMMATURE GRANULOCYTES: 0.2 %
KETONES, URINE: ABNORMAL
LEUKOCYTE ESTERASE, URINE: NEGATIVE
LYMPHOCYTES # BLD: 37 %
LYMPHOCYTES ABSOLUTE: 1.7 THOU/MM3 (ref 1–4.8)
MCH RBC QN AUTO: 31.8 PG (ref 26–33)
MCHC RBC AUTO-ENTMCNC: 35.9 GM/DL (ref 32.2–35.5)
MCV RBC AUTO: 88.5 FL (ref 80–94)
MONOCYTES # BLD: 9.5 %
MONOCYTES ABSOLUTE: 0.4 THOU/MM3 (ref 0.4–1.3)
MRSA SCREEN RT-PCR: NEGATIVE
NITRITE, URINE: NEGATIVE
NUCLEATED RED BLOOD CELLS: 0 /100 WBC
PH UA: 5 (ref 5–9)
PLATELET # BLD: 110 THOU/MM3 (ref 130–400)
PMV BLD AUTO: 11.4 FL (ref 9.4–12.4)
POTASSIUM SERPL-SCNC: 3.8 MEQ/L (ref 3.5–5.2)
PROTEIN UA: NEGATIVE
RBC # BLD: 4.69 MILL/MM3 (ref 4.7–6.1)
SEG NEUTROPHILS: 50.9 %
SEGMENTED NEUTROPHILS ABSOLUTE COUNT: 2.3 THOU/MM3 (ref 1.8–7.7)
SODIUM BLD-SCNC: 142 MEQ/L (ref 135–145)
SPECIFIC GRAVITY, URINE: 1.03 (ref 1–1.03)
UROBILINOGEN, URINE: 1 EU/DL (ref 0–1)
WBC # BLD: 4.5 THOU/MM3 (ref 4.8–10.8)

## 2020-01-23 PROCEDURE — 80048 BASIC METABOLIC PNL TOTAL CA: CPT

## 2020-01-23 PROCEDURE — 81003 URINALYSIS AUTO W/O SCOPE: CPT

## 2020-01-23 PROCEDURE — 85025 COMPLETE CBC W/AUTO DIFF WBC: CPT

## 2020-01-23 PROCEDURE — 36415 COLL VENOUS BLD VENIPUNCTURE: CPT

## 2020-01-23 PROCEDURE — 87641 MR-STAPH DNA AMP PROBE: CPT

## 2020-01-23 PROCEDURE — 93005 ELECTROCARDIOGRAM TRACING: CPT | Performed by: ORTHOPAEDIC SURGERY

## 2020-01-24 ENCOUNTER — HOSPITAL ENCOUNTER (OUTPATIENT)
Age: 65
Setting detail: OUTPATIENT SURGERY
Discharge: HOME | End: 2020-01-24
Attending: ORTHOPAEDIC SURGERY | Admitting: ORTHOPAEDIC SURGERY
Payer: MEDICARE

## 2020-01-24 ENCOUNTER — APPOINTMENT (OUTPATIENT)
Dept: GENERAL RADIOLOGY | Age: 65
End: 2020-01-24
Attending: ORTHOPAEDIC SURGERY
Payer: MEDICARE

## 2020-01-24 ENCOUNTER — ANESTHESIA (OUTPATIENT)
Dept: OPERATING ROOM | Age: 65
End: 2020-01-24
Payer: MEDICARE

## 2020-01-24 VITALS
TEMPERATURE: 96.8 F | DIASTOLIC BLOOD PRESSURE: 76 MMHG | WEIGHT: 242.6 LBS | SYSTOLIC BLOOD PRESSURE: 153 MMHG | HEIGHT: 72 IN | OXYGEN SATURATION: 92 % | HEART RATE: 79 BPM | RESPIRATION RATE: 16 BRPM | BODY MASS INDEX: 32.86 KG/M2

## 2020-01-24 VITALS
RESPIRATION RATE: 9 BRPM | SYSTOLIC BLOOD PRESSURE: 141 MMHG | OXYGEN SATURATION: 100 % | DIASTOLIC BLOOD PRESSURE: 97 MMHG

## 2020-01-24 PROBLEM — S52.531D CLOSED COLLES' FRACTURE OF RIGHT RADIUS WITH ROUTINE HEALING: Status: ACTIVE | Noted: 2020-01-24

## 2020-01-24 PROCEDURE — 6370000000 HC RX 637 (ALT 250 FOR IP)

## 2020-01-24 PROCEDURE — 6360000002 HC RX W HCPCS: Performed by: ORTHOPAEDIC SURGERY

## 2020-01-24 PROCEDURE — 7100000011 HC PHASE II RECOVERY - ADDTL 15 MIN: Performed by: ORTHOPAEDIC SURGERY

## 2020-01-24 PROCEDURE — 3600000003 HC SURGERY LEVEL 3 BASE: Performed by: ORTHOPAEDIC SURGERY

## 2020-01-24 PROCEDURE — C1713 ANCHOR/SCREW BN/BN,TIS/BN: HCPCS | Performed by: ORTHOPAEDIC SURGERY

## 2020-01-24 PROCEDURE — 7100000010 HC PHASE II RECOVERY - FIRST 15 MIN: Performed by: ORTHOPAEDIC SURGERY

## 2020-01-24 PROCEDURE — 6360000002 HC RX W HCPCS: Performed by: NURSE ANESTHETIST, CERTIFIED REGISTERED

## 2020-01-24 PROCEDURE — 2500000003 HC RX 250 WO HCPCS: Performed by: ANESTHESIOLOGY

## 2020-01-24 PROCEDURE — 7100000000 HC PACU RECOVERY - FIRST 15 MIN: Performed by: ORTHOPAEDIC SURGERY

## 2020-01-24 PROCEDURE — 3700000000 HC ANESTHESIA ATTENDED CARE: Performed by: ORTHOPAEDIC SURGERY

## 2020-01-24 PROCEDURE — 3700000001 HC ADD 15 MINUTES (ANESTHESIA): Performed by: ORTHOPAEDIC SURGERY

## 2020-01-24 PROCEDURE — 6360000002 HC RX W HCPCS: Performed by: ANESTHESIOLOGY

## 2020-01-24 PROCEDURE — 2709999900 HC NON-CHARGEABLE SUPPLY: Performed by: ORTHOPAEDIC SURGERY

## 2020-01-24 PROCEDURE — 73110 X-RAY EXAM OF WRIST: CPT

## 2020-01-24 PROCEDURE — 2500000003 HC RX 250 WO HCPCS: Performed by: NURSE ANESTHETIST, CERTIFIED REGISTERED

## 2020-01-24 PROCEDURE — 3600000013 HC SURGERY LEVEL 3 ADDTL 15MIN: Performed by: ORTHOPAEDIC SURGERY

## 2020-01-24 PROCEDURE — 7100000001 HC PACU RECOVERY - ADDTL 15 MIN: Performed by: ORTHOPAEDIC SURGERY

## 2020-01-24 PROCEDURE — 2580000003 HC RX 258: Performed by: ORTHOPAEDIC SURGERY

## 2020-01-24 PROCEDURE — 3209999900 FLUORO FOR SURGICAL PROCEDURES

## 2020-01-24 DEVICE — K WIRE FIX L6IN DIA1.6MM ST S STL 3 SIDE DBL TRCR BOTH END: Type: IMPLANTABLE DEVICE | Site: WRIST | Status: FUNCTIONAL

## 2020-01-24 RX ORDER — PROMETHAZINE HYDROCHLORIDE 25 MG/ML
12.5 INJECTION, SOLUTION INTRAMUSCULAR; INTRAVENOUS
Status: DISCONTINUED | OUTPATIENT
Start: 2020-01-24 | End: 2020-01-24 | Stop reason: HOSPADM

## 2020-01-24 RX ORDER — HYDROCODONE BITARTRATE AND ACETAMINOPHEN 5; 325 MG/1; MG/1
1 TABLET ORAL EVERY 6 HOURS PRN
COMMUNITY
End: 2021-09-02 | Stop reason: ALTCHOICE

## 2020-01-24 RX ORDER — ONDANSETRON 2 MG/ML
INJECTION INTRAMUSCULAR; INTRAVENOUS PRN
Status: DISCONTINUED | OUTPATIENT
Start: 2020-01-24 | End: 2020-01-24 | Stop reason: SDUPTHER

## 2020-01-24 RX ORDER — DEXAMETHASONE SODIUM PHOSPHATE 4 MG/ML
INJECTION, SOLUTION INTRA-ARTICULAR; INTRALESIONAL; INTRAMUSCULAR; INTRAVENOUS; SOFT TISSUE PRN
Status: DISCONTINUED | OUTPATIENT
Start: 2020-01-24 | End: 2020-01-24 | Stop reason: SDUPTHER

## 2020-01-24 RX ORDER — SODIUM CHLORIDE 9 MG/ML
INJECTION, SOLUTION INTRAVENOUS CONTINUOUS
Status: DISCONTINUED | OUTPATIENT
Start: 2020-01-24 | End: 2020-01-24 | Stop reason: HOSPADM

## 2020-01-24 RX ORDER — PROPOFOL 10 MG/ML
INJECTION, EMULSION INTRAVENOUS PRN
Status: DISCONTINUED | OUTPATIENT
Start: 2020-01-24 | End: 2020-01-24 | Stop reason: SDUPTHER

## 2020-01-24 RX ORDER — LIDOCAINE HCL/PF 100 MG/5ML
SYRINGE (ML) INJECTION PRN
Status: DISCONTINUED | OUTPATIENT
Start: 2020-01-24 | End: 2020-01-24 | Stop reason: SDUPTHER

## 2020-01-24 RX ORDER — LABETALOL 20 MG/4 ML (5 MG/ML) INTRAVENOUS SYRINGE
10 EVERY 10 MIN PRN
Status: DISCONTINUED | OUTPATIENT
Start: 2020-01-24 | End: 2020-01-24 | Stop reason: HOSPADM

## 2020-01-24 RX ORDER — EPHEDRINE SULFATE/0.9% NACL/PF 50 MG/5 ML
SYRINGE (ML) INTRAVENOUS PRN
Status: DISCONTINUED | OUTPATIENT
Start: 2020-01-24 | End: 2020-01-24 | Stop reason: SDUPTHER

## 2020-01-24 RX ORDER — HYDROCODONE BITARTRATE AND ACETAMINOPHEN 5; 325 MG/1; MG/1
TABLET ORAL
Status: COMPLETED
Start: 2020-01-24 | End: 2020-01-24

## 2020-01-24 RX ORDER — SODIUM CHLORIDE 0.9 % (FLUSH) 0.9 %
10 SYRINGE (ML) INJECTION EVERY 12 HOURS SCHEDULED
Status: DISCONTINUED | OUTPATIENT
Start: 2020-01-24 | End: 2020-01-24 | Stop reason: HOSPADM

## 2020-01-24 RX ORDER — MIDAZOLAM HYDROCHLORIDE 1 MG/ML
INJECTION INTRAMUSCULAR; INTRAVENOUS PRN
Status: DISCONTINUED | OUTPATIENT
Start: 2020-01-24 | End: 2020-01-24 | Stop reason: SDUPTHER

## 2020-01-24 RX ORDER — FENTANYL CITRATE 50 UG/ML
50 INJECTION, SOLUTION INTRAMUSCULAR; INTRAVENOUS EVERY 5 MIN PRN
Status: DISCONTINUED | OUTPATIENT
Start: 2020-01-24 | End: 2020-01-24 | Stop reason: HOSPADM

## 2020-01-24 RX ORDER — HYDROCODONE BITARTRATE AND ACETAMINOPHEN 5; 325 MG/1; MG/1
2 TABLET ORAL EVERY 6 HOURS PRN
Status: DISCONTINUED | OUTPATIENT
Start: 2020-01-24 | End: 2020-01-24 | Stop reason: HOSPADM

## 2020-01-24 RX ORDER — FENTANYL CITRATE 50 UG/ML
INJECTION, SOLUTION INTRAMUSCULAR; INTRAVENOUS PRN
Status: DISCONTINUED | OUTPATIENT
Start: 2020-01-24 | End: 2020-01-24 | Stop reason: SDUPTHER

## 2020-01-24 RX ORDER — ONDANSETRON 2 MG/ML
4 INJECTION INTRAMUSCULAR; INTRAVENOUS EVERY 6 HOURS PRN
Status: DISCONTINUED | OUTPATIENT
Start: 2020-01-24 | End: 2020-01-24 | Stop reason: HOSPADM

## 2020-01-24 RX ORDER — SODIUM CHLORIDE 0.9 % (FLUSH) 0.9 %
10 SYRINGE (ML) INJECTION PRN
Status: DISCONTINUED | OUTPATIENT
Start: 2020-01-24 | End: 2020-01-24 | Stop reason: HOSPADM

## 2020-01-24 RX ADMIN — CEFAZOLIN 2 G: 10 INJECTION, POWDER, FOR SOLUTION INTRAVENOUS at 07:17

## 2020-01-24 RX ADMIN — Medication 10 MG: at 07:33

## 2020-01-24 RX ADMIN — HYDROCODONE BITARTRATE AND ACETAMINOPHEN 2 TABLET: 5; 325 TABLET ORAL at 09:47

## 2020-01-24 RX ADMIN — DEXAMETHASONE SODIUM PHOSPHATE 8 MG: 4 INJECTION, SOLUTION INTRAMUSCULAR; INTRAVENOUS at 07:23

## 2020-01-24 RX ADMIN — LABETALOL 20 MG/4 ML (5 MG/ML) INTRAVENOUS SYRINGE 10 MG: at 08:35

## 2020-01-24 RX ADMIN — FENTANYL CITRATE 50 MCG: 50 INJECTION, SOLUTION INTRAMUSCULAR; INTRAVENOUS at 08:25

## 2020-01-24 RX ADMIN — SODIUM CHLORIDE: 9 INJECTION, SOLUTION INTRAVENOUS at 06:50

## 2020-01-24 RX ADMIN — MIDAZOLAM HYDROCHLORIDE 2 MG: 1 INJECTION, SOLUTION INTRAMUSCULAR; INTRAVENOUS at 07:11

## 2020-01-24 RX ADMIN — PROPOFOL 200 MG: 10 INJECTION, EMULSION INTRAVENOUS at 07:19

## 2020-01-24 RX ADMIN — Medication 80 MG: at 07:19

## 2020-01-24 RX ADMIN — ONDANSETRON HYDROCHLORIDE 4 MG: 4 INJECTION, SOLUTION INTRAMUSCULAR; INTRAVENOUS at 07:48

## 2020-01-24 RX ADMIN — FENTANYL CITRATE 50 MCG: 50 INJECTION, SOLUTION INTRAMUSCULAR; INTRAVENOUS at 08:40

## 2020-01-24 RX ADMIN — FENTANYL CITRATE 50 MCG: 50 INJECTION, SOLUTION INTRAMUSCULAR; INTRAVENOUS at 07:27

## 2020-01-24 RX ADMIN — FENTANYL CITRATE 50 MCG: 50 INJECTION, SOLUTION INTRAMUSCULAR; INTRAVENOUS at 08:06

## 2020-01-24 ASSESSMENT — PULMONARY FUNCTION TESTS
PIF_VALUE: 15
PIF_VALUE: 1
PIF_VALUE: 1
PIF_VALUE: 17
PIF_VALUE: 17
PIF_VALUE: 6
PIF_VALUE: 13
PIF_VALUE: 12
PIF_VALUE: 15
PIF_VALUE: 14
PIF_VALUE: 8
PIF_VALUE: 5
PIF_VALUE: 7
PIF_VALUE: 6
PIF_VALUE: 7
PIF_VALUE: 0
PIF_VALUE: 6
PIF_VALUE: 19
PIF_VALUE: 3
PIF_VALUE: 2
PIF_VALUE: 3
PIF_VALUE: 2
PIF_VALUE: 14
PIF_VALUE: 17
PIF_VALUE: 17
PIF_VALUE: 3
PIF_VALUE: 0
PIF_VALUE: 16
PIF_VALUE: 17
PIF_VALUE: 0
PIF_VALUE: 15
PIF_VALUE: 16
PIF_VALUE: 1
PIF_VALUE: 1
PIF_VALUE: 11
PIF_VALUE: 3
PIF_VALUE: 16
PIF_VALUE: 16
PIF_VALUE: 5
PIF_VALUE: 8
PIF_VALUE: 9
PIF_VALUE: 2
PIF_VALUE: 17
PIF_VALUE: 14
PIF_VALUE: 3
PIF_VALUE: 3
PIF_VALUE: 16
PIF_VALUE: 2
PIF_VALUE: 7
PIF_VALUE: 16
PIF_VALUE: 2

## 2020-01-24 ASSESSMENT — PAIN SCALES - GENERAL
PAINLEVEL_OUTOF10: 5
PAINLEVEL_OUTOF10: 8
PAINLEVEL_OUTOF10: 4
PAINLEVEL_OUTOF10: 8
PAINLEVEL_OUTOF10: 5
PAINLEVEL_OUTOF10: 4

## 2020-01-24 ASSESSMENT — PAIN DESCRIPTION - DESCRIPTORS: DESCRIPTORS: ACHING

## 2020-01-24 ASSESSMENT — PAIN - FUNCTIONAL ASSESSMENT: PAIN_FUNCTIONAL_ASSESSMENT: 0-10

## 2020-01-24 NOTE — ANESTHESIA POSTPROCEDURE EVALUATION
Department of Anesthesiology  Postprocedure Note    Patient: Mielna Bill  MRN: [id number]  YOB: 1955  Date of evaluation: 1/24/2020  Time:  9:51 AM     Procedure Summary     Date:  01/24/20 Room / Location:  [address] [place]    Anesthesia Start:  0713 Anesthesia Stop:  0806    Procedure:  RIGHT WRIST CLOSED REDUCTION PERCUTANEOUS PINNING (Right Wrist) Diagnosis:  (RIGHT DISTAL RADIUS FRACTURE)    Surgeon:  Tisha Vang MD Responsible Provider:  Gale Wellington DO    Anesthesia Type:  general ASA Status:  2          Anesthesia Type: general    Sin Phase I: Sin Score: 8    Sin Phase II: Sin Score: 10    Last vitals: Reviewed and per EMR flowsheets.        Anesthesia Post Evaluation    Patient location during evaluation: floor  Patient participation: complete - patient participated  Level of consciousness: awake  Airway patency: patent  Nausea & Vomiting: no vomiting and no nausea  Cardiovascular status: hemodynamically stable  Respiratory status: acceptable  Hydration status: stable
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
## 2020-01-24 NOTE — BRIEF OP NOTE
Brief Postoperative Note  ______________________________________________________________    Patient: Anastasiya Solis  YOB: 1955  MRN: [id number]  Date of Procedure: 1/24/2020    Pre-Op Diagnosis: RIGHT DISTAL RADIUS FRACTURE    Post-Op Diagnosis: Same       Procedure(s):  RIGHT WRIST CLOSED REDUCTION PERCUTANEOUS PINNING    Anesthesia: General    Surgeon(s):  Matthew Castro MD    Assistant: Billie Tran [facility]    Estimated Blood Loss (mL): less than 50     Complications: None    Specimens:   * No specimens in log *    Implants:  Implant Name Type Inv.  Item Serial No.  Lot No. LRB No. Used   IMPL KWIRE [id number] ST Screw/Plate/Nail/Thomas IMPL KWIRE [id number] [place]  Right 6         Drains: * No LDAs found *    Findings: Right distal radius fracture    Matthew Castro MD  Date: 1/24/2020  Time: 8:17 AM

## 2020-01-24 NOTE — PROGRESS NOTES
ADMITTED TO [facility] AND ORIENTED TO UNIT. SCDS ON. FALL AND ALLERGY BANDS ON. PT VERBALIZED APPROVAL FOR FIRST NAME, LAST INITIAL AND PHYSICIAN NAME ON UNIT WHITEBOARD. Spouse with the patient.

## 2020-01-24 NOTE — PROGRESS NOTES
0805  pt. Asleep with sonorous respers on adm. To pacu. Right arm elevated. Right arm cast intact and dry. 0825  Pt. Awake and oriented. Pt. Complains of pain. Pt. Medicated for pain 5 out of 10.  0830  Pt. Asleep with sonorous respers. Dr. Beverly Oliveros to see pt.  7422  Pt. Medicated for elevated blood pressure. 0845  Pt. Asleep with sonorous respers. o2 sat < 90%. respers < 10. Pt. Encouraged to deep breathe.  0900  Pt. Continues to sleep with sonorous respers. 2160  pacu criteria met. Transfer to [facility]. Wife sent to pt. Room.

## 2020-01-24 NOTE — ANESTHESIA PRE PROCEDURE
Department of Anesthesiology  Preprocedure Note       Name:  Sandoval Arboleda   Age:  62 y.o.  :  1955                                          MRN:  [id number]         Date:  2020      Surgeon: Chen Velasco):  Dasha Serna MD    Procedure: RIGHT WRIST CLOSED REDUCTION PERCUTANEOUS PINNING (Right )    Medications prior to admission:   Prior to Admission medications    Medication Sig Start Date End Date Taking? Authorizing Provider   HYDROcodone-acetaminophen (NORCO) 5-325 MG per tablet Take 1 tablet by mouth every 6 hours as needed for Pain. Yes Historical Provider, MD   levothyroxine (SYNTHROID) 125 MCG tablet TAKE 1 TABLET BY MOUTH DAILY 19  Yes Belia Soriano MD   enalapril (VASOTEC) 10 MG tablet Take 1 tablet by mouth 2 times daily 19  Yes Belia Soriano MD   fenofibrate 160 MG tablet Take 1 tablet by mouth daily 19  Yes Belia Soriano MD   magnesium oxide (MAG-OX) 400 MG tablet Take 400 mg by mouth daily   Yes Historical Provider, MD   OXcarbazepine (TRILEPTAL) 300 MG tablet Take 300 mg by mouth 2 times daily   Yes Historical Provider, MD   Lutein 20 MG CAPS Take by mouth   Yes Historical Provider, MD   Coenzyme Q10 (CO Q-10) 200 MG CAPS Take by mouth daily   Yes Historical Provider, MD   Phenylephrine-APAP-Guaifenesin (TYLENOL SINUS SEVERE PO) Take by mouth as needed   Yes Historical Provider, MD   KRILL OIL PO Take 300 mg by mouth daily Aguilar Red Omega-3 Krill Oil- 300 mg   Yes Historical Provider, MD   levETIRAcetam (KEPPRA) 500 MG tablet Take 1,000 mg by mouth 2 times daily    Yes Historical Provider, MD   UNABLE TO FIND Vitamin D3 2000 IU    Yes Historical Provider, MD   Famotidine (PEPCID AC PO) Take 20 mg by mouth. Yes Historical Provider, MD   Probiotic Product (PROBIOTIC ACIDOPHILUS PO) Take 1 tablet by mouth 2 times daily. Yes Historical Provider, MD   sertraline (ZOLOFT) 50 MG tablet TAKE 1 TABLET BY MOUTH DAILY.  19   Belia Soriano MD FB   Neck ROM: full  Mouth opening: > = 3 FB Dental:          Pulmonary:                              Cardiovascular:    (+) hypertension:,       ECG reviewed                        Neuro/Psych:   (+) psychiatric history:            GI/Hepatic/Renal:             Endo/Other:    (+) hypothyroidism::., .                 Abdominal:           Vascular:                                        Anesthesia Plan      general     ASA 2       Induction: intravenous. MIPS: Postoperative opioids intended and Prophylactic antiemetics administered. Anesthetic plan and risks discussed with patient and spouse. Plan discussed with CRNA. Peder Aase.  DO Yesenia   1/24/2020

## 2020-01-24 NOTE — OP NOTE
[address] [place] [address]                                OPERATIVE REPORT    PATIENT NAME: Robin Cope                  :        1955  MED REC NO:   [id number]                           ROOM:  ACCOUNT NO:   [de-identified]                           ADMIT DATE: 2020  PROVIDER:     Caroline Nava. Marion Ramirez M.D.    Krysta Nancebush:  2020    PRINCIPAL DIAGNOSIS:  Displaced right distal radius fracture. POSTOPERATIVE DIAGNOSIS:  Displaced right distal radius fracture. OPERATION PERFORMED:  Closed reduction percutaneous pinning and  application of long arm cast to the right wrist.    SURGEON:  Caroline Nava. Marion Ramirez MD    FIRST ASSISTANT:  Mustapha Burnett PA-C.    COMPLICATIONS:  None. ANESTHESIA:  General.    CONDITION TO RECOVERY:  Good. SPONGE:  Correct. Specimen none    ebl 5 ml    INDICATION FOR PROCEDURE:  The patient is a 71-year-old white male with  multiple medical problems. He has a  shunt secondary to head injury. He is blind. He has lost his right eye. He has difficulty with  ambulation. He has an unsteady gait. Unfortunately, he fell and  injured his right wrist on 2020. He was evaluated in the office  yesterday. I noted he had a dorsal tilt fracture. Unfortunately, this  is his dominant arm. Secondary to head injury, he does not have much  function of his left arm. So, I recommended because of the dorsal tilt  and because he needs this arm to get a closed reduction percutaneous  pinning and long arm cast stabilize it while it is healing. He  understands risks, benefits of the above procedure, desired to have the  above procedure, understood the pre and postop protocols. We will have  the above procedure today. Understanding risks, benefits, the procedure  performed today.     DESCRIPTION OF OPERATION:  The patient was brought to the operating room  and placed on the OR table in

## 2020-06-24 ENCOUNTER — TELEPHONE (OUTPATIENT)
Dept: FAMILY MEDICINE CLINIC | Age: 65
End: 2020-06-24

## 2020-06-25 ENCOUNTER — TELEPHONE (OUTPATIENT)
Dept: FAMILY MEDICINE CLINIC | Age: 65
End: 2020-06-25

## 2020-07-01 ENCOUNTER — OFFICE VISIT (OUTPATIENT)
Dept: FAMILY MEDICINE CLINIC | Age: 65
End: 2020-07-01
Payer: MEDICARE

## 2020-07-01 VITALS
WEIGHT: 243 LBS | HEART RATE: 86 BPM | BODY MASS INDEX: 34.79 KG/M2 | DIASTOLIC BLOOD PRESSURE: 82 MMHG | HEIGHT: 70 IN | TEMPERATURE: 97.7 F | SYSTOLIC BLOOD PRESSURE: 130 MMHG | RESPIRATION RATE: 16 BRPM

## 2020-07-01 PROCEDURE — 99495 TRANSJ CARE MGMT MOD F2F 14D: CPT | Performed by: FAMILY MEDICINE

## 2020-07-01 PROCEDURE — 1111F DSCHRG MED/CURRENT MED MERGE: CPT | Performed by: FAMILY MEDICINE

## 2020-07-01 ASSESSMENT — ENCOUNTER SYMPTOMS
CONSTIPATION: 0
NAUSEA: 0
WHEEZING: 0
EYE PAIN: 0
CHEST TIGHTNESS: 0
VOMITING: 0
SHORTNESS OF BREATH: 0
BLOOD IN STOOL: 0
COUGH: 0
SORE THROAT: 0
DIARRHEA: 0
BACK PAIN: 0
ABDOMINAL PAIN: 0
RHINORRHEA: 0

## 2020-07-01 NOTE — PROGRESS NOTES
tightness, shortness of breath and wheezing. Cardiovascular: Negative for chest pain and palpitations. Gastrointestinal: Negative for abdominal pain, blood in stool, constipation, diarrhea, nausea and vomiting. Genitourinary: Negative for difficulty urinating, frequency, hematuria and urgency. Musculoskeletal: Negative for back pain, joint swelling, myalgias and neck pain. Skin: Negative for rash. Neurological: Negative for dizziness and headaches. Hematological: Negative for adenopathy. Does not bruise/bleed easily. Psychiatric/Behavioral: Negative for behavioral problems and sleep disturbance. The patient is not nervous/anxious. Physical Exam  Vitals signs and nursing note reviewed. Constitutional:       Appearance: He is well-developed. HENT:      Head: Normocephalic and atraumatic. Right Ear: External ear normal.      Left Ear: External ear normal.      Nose: Nose normal.      Mouth/Throat:      Mouth: Mucous membranes are moist.   Eyes:      Pupils: Pupils are equal, round, and reactive to light. Neck:      Musculoskeletal: Neck supple. Thyroid: No thyromegaly. Vascular: No carotid bruit. Cardiovascular:      Rate and Rhythm: Normal rate and regular rhythm. Heart sounds: Normal heart sounds. Pulmonary:      Breath sounds: Normal breath sounds. No wheezing or rales. Abdominal:      General: Bowel sounds are normal.      Palpations: Abdomen is soft. Tenderness: There is no abdominal tenderness. There is no guarding or rebound. Musculoskeletal: Normal range of motion. Lymphadenopathy:      Cervical: No cervical adenopathy. Skin:     General: Skin is warm and dry. Findings: No rash. Neurological:      Mental Status: He is alert and oriented to person, place, and time. Cranial Nerves: No cranial nerve deficit. Deep Tendon Reflexes: Reflexes are normal and symmetric.        Health Maintenance   Topic Date Due    HIV screen  09/10/1971

## 2020-07-01 NOTE — PATIENT INSTRUCTIONS
Patient Education        High Blood Pressure: Care Instructions  Overview     It's normal for blood pressure to go up and down throughout the day. But if it stays up, you have high blood pressure. Another name for high blood pressure is hypertension. Despite what a lot of people think, high blood pressure usually doesn't cause headaches or make you feel dizzy or lightheaded. It usually has no symptoms. But it does increase your risk of stroke, heart attack, and other problems. You and your doctor will talk about your risks of these problems based on your blood pressure. Your doctor will give you a goal for your blood pressure. Your goal will be based on your health and your age. Lifestyle changes, such as eating healthy and being active, are always important to help lower blood pressure. You might also take medicine to reach your blood pressure goal.  Follow-up care is a key part of your treatment and safety. Be sure to make and go to all appointments, and call your doctor if you are having problems. It's also a good idea to know your test results and keep a list of the medicines you take. How can you care for yourself at home? Medical treatment  · If you stop taking your medicine, your blood pressure will go back up. You may take one or more types of medicine to lower your blood pressure. Be safe with medicines. Take your medicine exactly as prescribed. Call your doctor if you think you are having a problem with your medicine. · Talk to your doctor before you start taking aspirin every day. Aspirin can help certain people lower their risk of a heart attack or stroke. But taking aspirin isn't right for everyone, because it can cause serious bleeding. · See your doctor regularly. You may need to see the doctor more often at first or until your blood pressure comes down.   · If you are taking blood pressure medicine, talk to your doctor before you take decongestants or anti-inflammatory medicine, such as irregular heartbeat. · You have symptoms of a stroke. These may include:  ? Sudden numbness, tingling, weakness, or loss of movement in your face, arm, or leg, especially on only one side of your body. ? Sudden vision changes. ? Sudden trouble speaking. ? Sudden confusion or trouble understanding simple statements. ? Sudden problems with walking or balance. ? A sudden, severe headache that is different from past headaches. · You have severe back or belly pain. Do not wait until your blood pressure comes down on its own. Get help right away. Call your doctor now or seek immediate care if:  · Your blood pressure is much higher than normal (such as 180/120 or higher), but you don't have symptoms. · You think high blood pressure is causing symptoms, such as:  ? Severe headache.  ? Blurry vision. Watch closely for changes in your health, and be sure to contact your doctor if:  · Your blood pressure measures higher than your doctor recommends at least 2 times. That means the top number is higher or the bottom number is higher, or both. · You think you may be having side effects from your blood pressure medicine. Where can you learn more? Go to https://VeriTranpepiceweb.ITelagen. org and sign in to your Mobii account. Enter X829 in the Truly box to learn more about \"High Blood Pressure: Care Instructions. \"     If you do not have an account, please click on the \"Sign Up Now\" link. Current as of: December 16, 2019               Content Version: 12.5  © 9099-5120 Healthwise, Incorporated. Care instructions adapted under license by [facility] [email or web address] [facility] ([facility]). If you have questions about a medical condition or this instruction, always ask your healthcare professional. [address] [address] any warranty or liability for your use of this information.          Patient Education        DASH Diet: Care Instructions  Your Care Instructions     The DASH diet is an eating plan that can help lower your blood pressure. DASH stands for Dietary Approaches to Stop Hypertension. Hypertension is high blood pressure. The DASH diet focuses on eating foods that are high in calcium, potassium, and magnesium. These nutrients can lower blood pressure. The foods that are highest in these nutrients are fruits, vegetables, low-fat dairy products, nuts, seeds, and legumes. But taking calcium, potassium, and magnesium supplements instead of eating foods that are high in those nutrients does not have the same effect. The DASH diet also includes whole grains, fish, and poultry. The DASH diet is one of several lifestyle changes your doctor may recommend to lower your high blood pressure. Your doctor may also want you to decrease the amount of sodium in your diet. Lowering sodium while following the DASH diet can lower blood pressure even further than just the DASH diet alone. Follow-up care is a key part of your treatment and safety. Be sure to make and go to all appointments, and call your doctor if you are having problems. It's also a good idea to know your test results and keep a list of the medicines you take. How can you care for yourself at home? Following the DASH diet  · Eat 4 to 5 servings of fruit each day. A serving is 1 medium-sized piece of fruit, ½ cup chopped or canned fruit, 1/4 cup dried fruit, or 4 ounces (½ cup) of fruit juice. Choose fruit more often than fruit juice. · Eat 4 to 5 servings of vegetables each day. A serving is 1 cup of lettuce or raw leafy vegetables, ½ cup of chopped or cooked vegetables, or 4 ounces (½ cup) of vegetable juice. Choose vegetables more often than vegetable juice. · Get 2 to 3 servings of low-fat and fat-free dairy each day. A serving is 8 ounces of milk, 1 cup of yogurt, or 1 ½ ounces of cheese. · Eat 6 to 8 servings of grains each day.  A serving is 1 slice of bread, 1 ounce of dry cereal, or ½ cup of cooked rice, pasta, or cooked cereal. Try to choose whole-grain products as much as possible. · Limit lean meat, poultry, and fish to 2 servings each day. A serving is 3 ounces, about the size of a deck of cards. · Eat 4 to 5 servings of nuts, seeds, and legumes (cooked dried beans, lentils, and split peas) each week. A serving is 1/3 cup of nuts, 2 tablespoons of seeds, or ½ cup of cooked beans or peas. · Limit fats and oils to 2 to 3 servings each day. A serving is 1 teaspoon of vegetable oil or 2 tablespoons of salad dressing. · Limit sweets and added sugars to 5 servings or less a week. A serving is 1 tablespoon jelly or jam, ½ cup sorbet, or 1 cup of lemonade. · Eat less than 2,300 milligrams (mg) of sodium a day. If you limit your sodium to 1,500 mg a day, you can lower your blood pressure even more. Tips for success  · Start small. Do not try to make dramatic changes to your diet all at once. You might feel that you are missing out on your favorite foods and then be more likely to not follow the plan. Make small changes, and stick with them. Once those changes become habit, add a few more changes. · Try some of the following:  ? Make it a goal to eat a fruit or vegetable at every meal and at snacks. This will make it easy to get the recommended amount of fruits and vegetables each day. ? Try yogurt topped with fruit and nuts for a snack or healthy dessert. ? Add lettuce, tomato, cucumber, and onion to sandwiches. ? Combine a ready-made pizza crust with low-fat mozzarella cheese and lots of vegetable toppings. Try using tomatoes, squash, spinach, broccoli, carrots, cauliflower, and onions. ? Have a variety of cut-up vegetables with a low-fat dip as an appetizer instead of chips and dip. ? Sprinkle sunflower seeds or chopped almonds over salads. Or try adding chopped walnuts or almonds to cooked vegetables. ? Try some vegetarian meals using beans and peas. Add garbanzo or kidney beans to salads.  Make burritos and tacos with mashed byrd beans or black beans.  Where can you learn more? Go to https://chpepiceweb.BloomNation. org and sign in to your Trochet account. Enter U842 in the Nix Hydra box to learn more about \"DASH Diet: Care Instructions. \"     If you do not have an account, please click on the \"Sign Up Now\" link. Current as of: December 16, 2019               Content Version: 12.5  © 2006-2020 [employer]. Care instructions adapted under license by [facility] ([facility]). If you have questions about a medical condition or this instruction, always ask your healthcare professional. [place] [address] any warranty or liability for your use of this information. Patient Education        Hypoglycemia: Care Instructions  Your Care Instructions     Hypoglycemia means that your blood sugar is low and your body is not getting enough fuel. Some people get low blood sugar from not eating often enough. Some medicines to treat diabetes can cause low blood sugar. People who have had surgery on their stomachs or intestines may get hypoglycemia. Problems with the pancreas, kidneys, or liver also can cause low blood sugar. A snack or drink with sugar in it will raise your blood sugar and should ease your symptoms right away. Your doctor may recommend that you change or stop your medicines until you can get your blood sugar levels under control. In the long run, you may need to change your diet and eating habits so that you get enough fuel for your body throughout the day. Follow-up care is a key part of your treatment and safety. Be sure to make and go to all appointments, and call your doctor if you are having problems. It's also a good idea to know your test results and keep a list of the medicines you take. How can you care for yourself at home? · Learn to recognize the early signs of low blood sugar. Signs include:  ? Nausea. ? Hunger. ? Feeling nervous, irritable, or shaky. ? Cold, clammy, wet skin. ?  Sweating (when you are not exercising). ? A fast heartbeat.  ? Numbness or tingling of the fingertips or lips. · If you feel an episode of low blood sugar coming on, eat or drink a quick-sugar food. Some examples of quick-sugar foods are glucose tablets, table sugar, hard candy (such as Life Savers), fruit juice, and regular (not diet) soda. · Eat small, frequent meals so that you do not get too hungry between meals. · Balance extra exercise with eating more. · Keep a written record of your low blood sugar episodes, including when you last ate and what you ate, so that you can learn what causes your blood sugar to drop. · Make sure your family, friends, and coworkers know the symptoms of low blood sugar and know what to do to get your sugar level up. · Wear medical alert jewelry that lists your condition. You can buy this at most drugstores. When should you call for help? [id number] anytime you think you may need emergency care. For example, call if:  · You passed out (lost consciousness). · You are confused or cannot think clearly. · Your blood sugar is very high or very low. Watch closely for changes in your health, and be sure to contact your doctor if:  · Your blood sugar stays outside the level your doctor set for you. · You have any problems. Where can you learn more? Go to https://Arjuna Solutionspejacoboeweb.EdÃºkame. org and sign in to your Billdesk account. Enter R141 in the B[employer] box to learn more about \"Hypoglycemia: Care Instructions. \"     If you do not have an account, please click on the \"Sign Up Now\" link. Current as of: December 20, 2019               Content Version: 12.5  © 8793-2517 Healthwise, Incorporated. Care instructions adapted under license by [facility] ([facility]). If you have questions about a medical condition or this instruction, always ask your healthcare professional. [place] [address] any warranty or liability for your use of this information.

## 2020-09-11 NOTE — TELEPHONE ENCOUNTER
Maryam Sorenson called requesting a refill on the following medications:  Requested Prescriptions     Pending Prescriptions Disp Refills    sertraline (ZOLOFT) 50 MG tablet 30 tablet 11     Sig: TAKE 1 TABLET BY MOUTH DAILY.  enalapril (VASOTEC) 10 MG tablet 60 tablet 11     Sig: Take 1 tablet by mouth 2 times daily     Pharmacy verified: [facility]  . pv      Date of last visit: 7/1/2020  Date of next visit (if applicable): 7/57/6945

## 2020-09-14 RX ORDER — ENALAPRIL MALEATE 10 MG/1
10 TABLET ORAL 2 TIMES DAILY
Qty: 60 TABLET | Refills: 11 | OUTPATIENT
Start: 2020-09-14

## 2020-09-16 ENCOUNTER — OFFICE VISIT (OUTPATIENT)
Dept: FAMILY MEDICINE CLINIC | Age: 65
End: 2020-09-16
Payer: MEDICARE

## 2020-09-16 VITALS
DIASTOLIC BLOOD PRESSURE: 74 MMHG | HEIGHT: 70 IN | TEMPERATURE: 97 F | SYSTOLIC BLOOD PRESSURE: 136 MMHG | WEIGHT: 250 LBS | HEART RATE: 82 BPM | RESPIRATION RATE: 14 BRPM | BODY MASS INDEX: 35.79 KG/M2

## 2020-09-16 PROCEDURE — 99396 PREV VISIT EST AGE 40-64: CPT | Performed by: FAMILY MEDICINE

## 2020-09-16 RX ORDER — ENALAPRIL MALEATE 10 MG/1
TABLET ORAL
Qty: 60 TABLET | Refills: 11 | OUTPATIENT
Start: 2020-09-16

## 2020-09-16 RX ORDER — LEVOTHYROXINE SODIUM 0.12 MG/1
TABLET ORAL
Qty: 30 TABLET | Refills: 11 | Status: SHIPPED | OUTPATIENT
Start: 2020-09-16 | End: 2021-09-02 | Stop reason: SDUPTHER

## 2020-09-16 RX ORDER — FENOFIBRATE 160 MG/1
TABLET ORAL
Qty: 30 TABLET | Refills: 11 | OUTPATIENT
Start: 2020-09-16

## 2020-09-16 RX ORDER — ENALAPRIL MALEATE 10 MG/1
10 TABLET ORAL 2 TIMES DAILY
Qty: 60 TABLET | Refills: 11 | Status: SHIPPED | OUTPATIENT
Start: 2020-09-16 | End: 2021-09-02 | Stop reason: SDUPTHER

## 2020-09-16 RX ORDER — FENOFIBRATE 160 MG/1
160 TABLET ORAL DAILY
Qty: 30 TABLET | Refills: 11 | Status: SHIPPED | OUTPATIENT
Start: 2020-09-16 | End: 2021-09-02 | Stop reason: SDUPTHER

## 2020-09-16 ASSESSMENT — ENCOUNTER SYMPTOMS
DIARRHEA: 0
COUGH: 0
VOMITING: 0
WHEEZING: 0
BACK PAIN: 0
ABDOMINAL PAIN: 0
EYE PAIN: 0
CHEST TIGHTNESS: 0
SHORTNESS OF BREATH: 0
RHINORRHEA: 0
BLOOD IN STOOL: 0
NAUSEA: 0
CONSTIPATION: 0
SORE THROAT: 0

## 2020-09-16 NOTE — TELEPHONE ENCOUNTER
Connor Randle needs refill of   Requested Prescriptions     Pending Prescriptions Disp Refills    enalapril (VASOTEC) 10 MG tablet [Pharmacy Med Name: ENALAPRIL MALEATE 10MG TABLET] 60 tablet 11     Sig: TAKE ONE (1) TABLET BY MOUTH TWO (2) TIMES DAILY    fenofibrate (TRIGLIDE) 160 MG tablet [Pharmacy Med Name: FENOFIBRATE 160MG TABLET] 30 tablet 11     Sig: TAKE ONE (1) TABLET BY MOUTH DAILY    sertraline (ZOLOFT) 50 MG tablet [Pharmacy Med Name: SERTRALINE HCL 50MG TABLET] 30 tablet 11     Sig: TAKE ONE (1) TABLET BY MOUTH DAILY.        Last Filled on:  09/11/2020    Last Visit Date:  7/1/2020    Next Visit Date:    9/16/2020

## 2020-09-16 NOTE — PATIENT INSTRUCTIONS
during a routine doctor visit. Your doctor will tell you how often to check your blood pressure based on your age, your blood pressure results, and other factors. · Prostate exam. Talk to your doctor about whether you should have a blood test (called a PSA test) for prostate cancer. Experts recommend that you discuss the benefits and risks of the test with your doctor before you decide whether to have this test.  · Diabetes. Ask your doctor whether you should have tests for diabetes. · Vision. Some experts recommend that you have yearly exams for glaucoma and other age-related eye problems starting at age 48. · Hearing. Tell your doctor if you notice any change in your hearing. You can have tests to find out how well you hear. · Colorectal cancer. Your risk for colorectal cancer gets higher as you get older. Some experts say that adults should start regular screening at age 48 and stop at age 76. Others say to start before age 48 or continue after age 76. Talk with your doctor about your risk and when to start and stop screening. · Heart attack and stroke risk. At least every 4 to 6 years, you should have your risk for heart attack and stroke assessed. Your doctor uses factors such as your age, blood pressure, cholesterol, and whether you smoke or have diabetes to show what your risk for a heart attack or stroke is over the next 10 years. · Abdominal aortic aneurysm. Ask your doctor whether you should have a test to check for an aneurysm. You may need a test if you ever smoked or if your parent, brother, sister, or child has had an aneurysm. When should you call for help? Watch closely for changes in your health, and be sure to contact your doctor if you have any problems or symptoms that concern you. Where can you learn more? Go to https://selena.[employer]. org and sign in to your [employer] account.  Enter J932 in the [facility] box to learn more about \"Well Visit, Men 48 to 72: Care Another name for high blood pressure is hypertension. Despite what a lot of people think, high blood pressure usually doesn't cause headaches or make you feel dizzy or lightheaded. It usually has no symptoms. But it does increase your risk of stroke, heart attack, and other problems. You and your doctor will talk about your risks of these problems based on your blood pressure. Your doctor will give you a goal for your blood pressure. Your goal will be based on your health and your age. Lifestyle changes, such as eating healthy and being active, are always important to help lower blood pressure. You might also take medicine to reach your blood pressure goal.  Follow-up care is a key part of your treatment and safety. Be sure to make and go to all appointments, and call your doctor if you are having problems. It's also a good idea to know your test results and keep a list of the medicines you take. How can you care for yourself at home? Medical treatment  · If you stop taking your medicine, your blood pressure will go back up. You may take one or more types of medicine to lower your blood pressure. Be safe with medicines. Take your medicine exactly as prescribed. Call your doctor if you think you are having a problem with your medicine. · Talk to your doctor before you start taking aspirin every day. Aspirin can help certain people lower their risk of a heart attack or stroke. But taking aspirin isn't right for everyone, because it can cause serious bleeding. · See your doctor regularly. You may need to see the doctor more often at first or until your blood pressure comes down. · If you are taking blood pressure medicine, talk to your doctor before you take decongestants or anti-inflammatory medicine, such as ibuprofen. Some of these medicines can raise blood pressure. · Learn how to check your blood pressure at home. Lifestyle changes  · Stay at a healthy weight.  This is especially important if you put on body.  ? Sudden vision changes. ? Sudden trouble speaking. ? Sudden confusion or trouble understanding simple statements. ? Sudden problems with walking or balance. ? A sudden, severe headache that is different from past headaches. · You have severe back or belly pain. Do not wait until your blood pressure comes down on its own. Get help right away. Call your doctor now or seek immediate care if:  · Your blood pressure is much higher than normal (such as 180/120 or higher), but you don't have symptoms. · You think high blood pressure is causing symptoms, such as:  ? Severe headache.  ? Blurry vision. Watch closely for changes in your health, and be sure to contact your doctor if:  · Your blood pressure measures higher than your doctor recommends at least 2 times. That means the top number is higher or the bottom number is higher, or both. · You think you may be having side effects from your blood pressure medicine. Where can you learn more? Go to https://Happy Days.[employer]. org and sign in to your [employer] account. Enter Y613 in the [employer] box to learn more about \"High Blood Pressure: Care Instructions. \"     If you do not have an account, please click on the \"Sign Up Now\" link. Current as of: December 16, 2019               Content Version: 12.5  © 9150-5035 Healthwise, Incorporated. Care instructions adapted under license by [facility] ([facility]). If you have questions about a medical condition or this instruction, always ask your healthcare professional. [place] [address] any warranty or liability for your use of this information. Patient Education        DASH Diet: Care Instructions  Your Care Instructions     The DASH diet is an eating plan that can help lower your blood pressure. DASH stands for Dietary Approaches to Stop Hypertension. Hypertension is high blood pressure.   The DASH diet focuses on eating foods that are high in calcium, potassium, and magnesium. These nutrients can lower blood pressure. The foods that are highest in these nutrients are fruits, vegetables, low-fat dairy products, nuts, seeds, and legumes. But taking calcium, potassium, and magnesium supplements instead of eating foods that are high in those nutrients does not have the same effect. The DASH diet also includes whole grains, fish, and poultry. The DASH diet is one of several lifestyle changes your doctor may recommend to lower your high blood pressure. Your doctor may also want you to decrease the amount of sodium in your diet. Lowering sodium while following the DASH diet can lower blood pressure even further than just the DASH diet alone. Follow-up care is a key part of your treatment and safety. Be sure to make and go to all appointments, and call your doctor if you are having problems. It's also a good idea to know your test results and keep a list of the medicines you take. How can you care for yourself at home? Following the DASH diet  · Eat 4 to 5 servings of fruit each day. A serving is 1 medium-sized piece of fruit, ½ cup chopped or canned fruit, 1/4 cup dried fruit, or 4 ounces (½ cup) of fruit juice. Choose fruit more often than fruit juice. · Eat 4 to 5 servings of vegetables each day. A serving is 1 cup of lettuce or raw leafy vegetables, ½ cup of chopped or cooked vegetables, or 4 ounces (½ cup) of vegetable juice. Choose vegetables more often than vegetable juice. · Get 2 to 3 servings of low-fat and fat-free dairy each day. A serving is 8 ounces of milk, 1 cup of yogurt, or 1 ½ ounces of cheese. · Eat 6 to 8 servings of grains each day. A serving is 1 slice of bread, 1 ounce of dry cereal, or ½ cup of cooked rice, pasta, or cooked cereal. Try to choose whole-grain products as much as possible. · Limit lean meat, poultry, and fish to 2 servings each day. A serving is 3 ounces, about the size of a deck of cards.   · Eat 4 to 5 servings of nuts, seeds, and legumes (cooked dried beans, lentils, and split peas) each week. A serving is 1/3 cup of nuts, 2 tablespoons of seeds, or ½ cup of cooked beans or peas. · Limit fats and oils to 2 to 3 servings each day. A serving is 1 teaspoon of vegetable oil or 2 tablespoons of salad dressing. · Limit sweets and added sugars to 5 servings or less a week. A serving is 1 tablespoon jelly or jam, ½ cup sorbet, or 1 cup of lemonade. · Eat less than 2,300 milligrams (mg) of sodium a day. If you limit your sodium to 1,500 mg a day, you can lower your blood pressure even more. Tips for success  · Start small. Do not try to make dramatic changes to your diet all at once. You might feel that you are missing out on your favorite foods and then be more likely to not follow the plan. Make small changes, and stick with them. Once those changes become habit, add a few more changes. · Try some of the following:  ? Make it a goal to eat a fruit or vegetable at every meal and at snacks. This will make it easy to get the recommended amount of fruits and vegetables each day. ? Try yogurt topped with fruit and nuts for a snack or healthy dessert. ? Add lettuce, tomato, cucumber, and onion to sandwiches. ? Combine a ready-made pizza crust with low-fat mozzarella cheese and lots of vegetable toppings. Try using tomatoes, squash, spinach, broccoli, carrots, cauliflower, and onions. ? Have a variety of cut-up vegetables with a low-fat dip as an appetizer instead of chips and dip. ? Sprinkle sunflower seeds or chopped almonds over salads. Or try adding chopped walnuts or almonds to cooked vegetables. ? Try some vegetarian meals using beans and peas. Add garbanzo or kidney beans to salads. Make burritos and tacos with mashed byrd beans or black beans. Where can you learn more? Go to https://selena.[employer]. org and sign in to your [employer] account.  Enter B933 in the [facility] box to learn more about \"DASH Diet: Care Instructions. \"     If you do not have an account, please click on the \"Sign Up Now\" link. Current as of: December 16, 2019               Content Version: 12.5  © 6461-2111 Healthwise, Incorporated. Care instructions adapted under license by [facility] ([facility]). If you have questions about a medical condition or this instruction, always ask your healthcare professional. [address] any warranty or liability for your use of this information.

## 2020-09-16 NOTE — PROGRESS NOTES
Subjective:      Patient ID: Chelo Heredia is a 60 y.o. male. HPI  Pt here for annual physical exam and med refills. Needs fasting blood work. Reviewed BMI of 36. Encouraged diet, exercise and weight loss. Reviewed health maintenance, upcoming colonoscopy. Overall is doing well. They frustrated the his neurologist.  , non smoker, pmh reviewed. Review of Systems   Constitutional: Positive for fatigue. Negative for chills, fever and unexpected weight change. HENT: Negative for congestion, ear pain, rhinorrhea and sore throat. Eyes: Negative for pain and visual disturbance. Respiratory: Negative for cough, chest tightness, shortness of breath and wheezing. Cardiovascular: Negative for chest pain and palpitations. Gastrointestinal: Negative for abdominal pain, blood in stool, constipation, diarrhea, nausea and vomiting. Genitourinary: Negative for difficulty urinating, frequency, hematuria and urgency. Musculoskeletal: Negative for back pain, joint swelling, myalgias and neck pain. Skin: Negative for rash. Neurological: Negative for dizziness and headaches. Hematological: Negative for adenopathy. Does not bruise/bleed easily. Psychiatric/Behavioral: Positive for sleep disturbance. Negative for behavioral problems. The patient is not nervous/anxious. Objective:   Physical Exam  Vitals signs and nursing note reviewed. Constitutional:       Appearance: He is well-developed. HENT:      Head: Normocephalic and atraumatic. Right Ear: External ear normal. Decreased hearing noted. Left Ear: External ear normal. Decreased hearing noted. Ears:      Comments: Bilateral hearing aides     Nose: Nose normal.      Mouth/Throat:      Mouth: Mucous membranes are moist.   Eyes:      Pupils: Pupils are equal, round, and reactive to light. Neck:      Musculoskeletal: Neck supple. Thyroid: No thyromegaly. Vascular: No carotid bruit.    Cardiovascular: Rate and Rhythm: Normal rate and regular rhythm. Heart sounds: Normal heart sounds. Pulmonary:      Breath sounds: Normal breath sounds. No wheezing or rales. Abdominal:      General: Bowel sounds are normal.      Palpations: Abdomen is soft. Tenderness: There is no abdominal tenderness. There is no guarding or rebound. Musculoskeletal: Normal range of motion. Lymphadenopathy:      Cervical: No cervical adenopathy. Skin:     General: Skin is warm and dry. Findings: No rash. Neurological:      Mental Status: He is alert and oriented to person, place, and time. Cranial Nerves: No cranial nerve deficit. Deep Tendon Reflexes: Reflexes are normal and symmetric.        Health Maintenance   Topic Date Due    HIV screen  09/10/1970    Shingles Vaccine (2 of 3) 04/12/2016    Annual Wellness Visit (AWV)  06/20/2019    Flu vaccine (1) 09/01/2020    TSH testing  09/11/2020    Colon cancer screen colonoscopy  12/03/2020    Potassium monitoring  06/22/2021    Creatinine monitoring  06/22/2021    Lipid screen  09/11/2024    Hepatitis C screen  Completed    Hepatitis A vaccine  Aged Out    Hepatitis B vaccine  Aged Out    Hib vaccine  Aged Out    Meningococcal (ACWY) vaccine  Aged Out    Pneumococcal 0-64 years Vaccine  Aged Out     Lab Results   Component Value Date    CHOL 112 09/11/2019    CHOL 144 08/27/2018    CHOL 101 08/30/2017     Lab Results   Component Value Date    TRIG 159 09/11/2019    TRIG 160 11/14/2018    TRIG 803 (H) 08/27/2018     Lab Results   Component Value Date    HDL 24 09/11/2019    HDL 22 08/27/2018    HDL 24 (L) 08/30/2017     Lab Results   Component Value Date    LDLCALC 56 09/11/2019    LDLCALC SEE BELOW 08/27/2018    LDLCALC 13 08/30/2017     Lab Results   Component Value Date    LABVLDL 64 (H) 08/30/2017    LABVLDL 66 (H) 08/18/2016    LABVLDL 74 (H) 11/12/2014    VLDL 66 08/18/2016    VLDL 74 11/12/2014     Lab Results   Component Value Date CHOLHDLRATIO 4.2 08/30/2017    CHOLHDLRATIO 4.4 08/18/2016    CHOLHDLRATIO 4.4 08/18/2016     Lab Results   Component Value Date     06/22/2020    K 3.5 (L) 06/22/2020     06/22/2020    CO2 29 06/22/2020    BUN 20 06/22/2020    CREATININE 0.78 06/22/2020    GLUCOSE 93 06/22/2020    CALCIUM 9.10 06/22/2020    PROT 6.0 (L) 06/22/2020    LABALBU 3.6 06/22/2020    BILITOT 0.6 06/22/2020    ALKPHOS 31 (L) 06/22/2020    AST 31 06/22/2020    ALT 18 06/22/2020    LABGLOM 75 (A) 01/23/2020    AGRATIO 1.5 06/22/2020       Lab Results   Component Value Date    TSH <0.005 (L) 09/11/2019       Assessment:      Annual wellness exam      Plan:      REfill meds  Blood work  Encouraged diet, exercise and weight loss. Dash diet  Reviewed health maintenance    Jeremy Delarosa received counseling on the following healthy behaviors: nutrition, exercise and medication adherence    Patient given educational materials on Nutrition and Exercise    I have instructed Jeremy Delarosa to complete a self tracking handout on Weights and instructed them to bring it with them to his next appointment. Discussed use, benefit, and side effects of prescribed medications. Barriers to medication compliance addressed. All patient questions answered. Pt voiced understanding.            Kaylynn Medina MD

## 2020-09-17 ENCOUNTER — TELEPHONE (OUTPATIENT)
Dept: FAMILY MEDICINE CLINIC | Age: 65
End: 2020-09-17

## 2020-09-17 LAB
ALBUMIN SERPL-MCNC: 3.9 G/DL (ref 3.5–5.1)
ALP BLD-CCNC: 34 U/L (ref 38–126)
ALT SERPL-CCNC: 15 U/L (ref 11–66)
ANION GAP SERPL CALCULATED.3IONS-SCNC: 9 MEQ/L (ref 8–16)
AST SERPL-CCNC: 31 U/L (ref 5–40)
BASOPHILS # BLD: 0.6 %
BASOPHILS ABSOLUTE: 0 THOU/MM3 (ref 0–0.1)
BILIRUB SERPL-MCNC: 0.6 MG/DL (ref 0.3–1.2)
BUN BLDV-MCNC: 20 MG/DL (ref 7–22)
CALCIUM SERPL-MCNC: 9.1 MG/DL (ref 8.5–10.5)
CHLORIDE BLD-SCNC: 101 MEQ/L (ref 98–111)
CHOLESTEROL, TOTAL: 119 MG/DL (ref 100–199)
CO2: 27 MEQ/L (ref 23–33)
CREAT SERPL-MCNC: 0.8 MG/DL (ref 0.4–1.2)
EOSINOPHIL # BLD: 4.7 %
EOSINOPHILS ABSOLUTE: 0.2 THOU/MM3 (ref 0–0.4)
ERYTHROCYTE [DISTWIDTH] IN BLOOD BY AUTOMATED COUNT: 13.3 % (ref 11.5–14.5)
ERYTHROCYTE [DISTWIDTH] IN BLOOD BY AUTOMATED COUNT: 44.7 FL (ref 35–45)
GFR SERPL CREATININE-BSD FRML MDRD: > 90 ML/MIN/1.73M2
GLUCOSE BLD-MCNC: 110 MG/DL (ref 70–108)
HCT VFR BLD CALC: 41.9 % (ref 42–52)
HDLC SERPL-MCNC: 23 MG/DL
HEMOGLOBIN: 13.9 GM/DL (ref 14–18)
IMMATURE GRANS (ABS): 0.01 THOU/MM3 (ref 0–0.07)
IMMATURE GRANULOCYTES: 0.3 %
LDL CHOLESTEROL CALCULATED: 47 MG/DL
LYMPHOCYTES # BLD: 43.3 %
LYMPHOCYTES ABSOLUTE: 1.5 THOU/MM3 (ref 1–4.8)
MCH RBC QN AUTO: 30.5 PG (ref 26–33)
MCHC RBC AUTO-ENTMCNC: 33.2 GM/DL (ref 32.2–35.5)
MCV RBC AUTO: 92.1 FL (ref 80–94)
MONOCYTES # BLD: 8.7 %
MONOCYTES ABSOLUTE: 0.3 THOU/MM3 (ref 0.4–1.3)
NUCLEATED RED BLOOD CELLS: 0 /100 WBC
PLATELET # BLD: 101 THOU/MM3 (ref 130–400)
PMV BLD AUTO: 11.8 FL (ref 9.4–12.4)
POTASSIUM SERPL-SCNC: 3.8 MEQ/L (ref 3.5–5.2)
PROSTATE SPECIFIC ANTIGEN: 0.88 NG/ML (ref 0–1)
RBC # BLD: 4.55 MILL/MM3 (ref 4.7–6.1)
SEG NEUTROPHILS: 42.4 %
SEGMENTED NEUTROPHILS ABSOLUTE COUNT: 1.4 THOU/MM3 (ref 1.8–7.7)
SODIUM BLD-SCNC: 137 MEQ/L (ref 135–145)
T4 FREE: 1.2 NG/DL (ref 0.93–1.76)
TOTAL PROTEIN: 6.7 G/DL (ref 6.1–8)
TRIGL SERPL-MCNC: 243 MG/DL (ref 0–199)
TSH SERPL DL<=0.05 MIU/L-ACNC: < 0.005 UIU/ML (ref 0.4–4.2)
WBC # BLD: 3.4 THOU/MM3 (ref 4.8–10.8)

## 2020-09-17 NOTE — TELEPHONE ENCOUNTER
----- Message from Guicho Guallpa MD sent at 9/17/2020  4:20 PM EDT -----  Thyroids are ok. cmp is ok. Cholesterol at 119.  psa is good. Cbc is stable. Continue present.

## 2021-03-05 ENCOUNTER — TELEPHONE (OUTPATIENT)
Dept: ADMINISTRATIVE | Age: 66
End: 2021-03-05

## 2021-03-06 NOTE — TELEPHONE ENCOUNTER
Yes e can get it. The J&J shot has little to no allergic reactions. Actually being recommended to people who had allergic reaction to pfizer and moderna first shots.

## 2021-03-11 ENCOUNTER — IMMUNIZATION (OUTPATIENT)
Dept: PRIMARY CARE CLINIC | Age: 66
End: 2021-03-11
Payer: MEDICARE

## 2021-03-11 PROCEDURE — 0031A COVID-19, J&J VACCINE, PF, 0.5 ML DOSE: CPT | Performed by: FAMILY MEDICINE

## 2021-03-11 PROCEDURE — 91303 COVID-19, J&J VACCINE, PF, 0.5 ML DOSE: CPT | Performed by: FAMILY MEDICINE

## 2021-03-15 DIAGNOSIS — H54.7 BLINDNESS: ICD-10-CM

## 2021-09-02 ENCOUNTER — OFFICE VISIT (OUTPATIENT)
Dept: FAMILY MEDICINE CLINIC | Age: 66
End: 2021-09-02
Payer: MEDICARE

## 2021-09-02 VITALS
BODY MASS INDEX: 34.33 KG/M2 | HEIGHT: 70 IN | OXYGEN SATURATION: 96 % | SYSTOLIC BLOOD PRESSURE: 138 MMHG | HEART RATE: 71 BPM | RESPIRATION RATE: 16 BRPM | WEIGHT: 239.8 LBS | DIASTOLIC BLOOD PRESSURE: 76 MMHG | TEMPERATURE: 97.3 F

## 2021-09-02 DIAGNOSIS — I10 ESSENTIAL HYPERTENSION: ICD-10-CM

## 2021-09-02 DIAGNOSIS — F32.A DEPRESSION, UNSPECIFIED DEPRESSION TYPE: Primary | ICD-10-CM

## 2021-09-02 DIAGNOSIS — R35.1 NOCTURIA: ICD-10-CM

## 2021-09-02 DIAGNOSIS — E03.9 HYPOTHYROIDISM, UNSPECIFIED TYPE: ICD-10-CM

## 2021-09-02 DIAGNOSIS — E78.1 HYPERTRIGLYCERIDEMIA: ICD-10-CM

## 2021-09-02 PROCEDURE — G8417 CALC BMI ABV UP PARAM F/U: HCPCS | Performed by: FAMILY MEDICINE

## 2021-09-02 PROCEDURE — 1036F TOBACCO NON-USER: CPT | Performed by: FAMILY MEDICINE

## 2021-09-02 PROCEDURE — G8427 DOCREV CUR MEDS BY ELIG CLIN: HCPCS | Performed by: FAMILY MEDICINE

## 2021-09-02 PROCEDURE — 99214 OFFICE O/P EST MOD 30 MIN: CPT | Performed by: FAMILY MEDICINE

## 2021-09-02 PROCEDURE — 3017F COLORECTAL CA SCREEN DOC REV: CPT | Performed by: FAMILY MEDICINE

## 2021-09-02 RX ORDER — LEVOTHYROXINE SODIUM 0.12 MG/1
TABLET ORAL
Qty: 30 TABLET | Refills: 11 | Status: SHIPPED | OUTPATIENT
Start: 2021-09-02 | End: 2022-09-08 | Stop reason: SDUPTHER

## 2021-09-02 RX ORDER — FENOFIBRATE 160 MG/1
160 TABLET ORAL DAILY
Qty: 30 TABLET | Refills: 11 | Status: SHIPPED | OUTPATIENT
Start: 2021-09-02 | End: 2022-09-08 | Stop reason: SDUPTHER

## 2021-09-02 RX ORDER — ENALAPRIL MALEATE 10 MG/1
10 TABLET ORAL 2 TIMES DAILY
Qty: 60 TABLET | Refills: 11 | Status: SHIPPED | OUTPATIENT
Start: 2021-09-02 | End: 2022-09-08 | Stop reason: SDUPTHER

## 2021-09-02 SDOH — ECONOMIC STABILITY: FOOD INSECURITY: WITHIN THE PAST 12 MONTHS, YOU WORRIED THAT YOUR FOOD WOULD RUN OUT BEFORE YOU GOT MONEY TO BUY MORE.: NEVER TRUE

## 2021-09-02 SDOH — ECONOMIC STABILITY: FOOD INSECURITY: WITHIN THE PAST 12 MONTHS, THE FOOD YOU BOUGHT JUST DIDN'T LAST AND YOU DIDN'T HAVE MONEY TO GET MORE.: NEVER TRUE

## 2021-09-02 ASSESSMENT — ENCOUNTER SYMPTOMS
BACK PAIN: 0
NAUSEA: 0
COUGH: 0
EYE PAIN: 0
BLOOD IN STOOL: 0
CONSTIPATION: 0
DIARRHEA: 0
CHEST TIGHTNESS: 0
ABDOMINAL PAIN: 0
VOMITING: 0
RHINORRHEA: 0
SORE THROAT: 0
WHEEZING: 0
SHORTNESS OF BREATH: 0

## 2021-09-02 ASSESSMENT — SOCIAL DETERMINANTS OF HEALTH (SDOH): HOW HARD IS IT FOR YOU TO PAY FOR THE VERY BASICS LIKE FOOD, HOUSING, MEDICAL CARE, AND HEATING?: NOT VERY HARD

## 2021-09-02 NOTE — PATIENT INSTRUCTIONS
Patient Education        High Blood Pressure: Care Instructions  Overview     It's normal for blood pressure to go up and down throughout the day. But if it stays up, you have high blood pressure. Another name for high blood pressure is hypertension. Despite what a lot of people think, high blood pressure usually doesn't cause headaches or make you feel dizzy or lightheaded. It usually has no symptoms. But it does increase your risk of stroke, heart attack, and other problems. You and your doctor will talk about your risks of these problems based on your blood pressure. Your doctor will give you a goal for your blood pressure. Your goal will be based on your health and your age. Lifestyle changes, such as eating healthy and being active, are always important to help lower blood pressure. You might also take medicine to reach your blood pressure goal.  Follow-up care is a key part of your treatment and safety. Be sure to make and go to all appointments, and call your doctor if you are having problems. It's also a good idea to know your test results and keep a list of the medicines you take. How can you care for yourself at home? Medical treatment  · If you stop taking your medicine, your blood pressure will go back up. You may take one or more types of medicine to lower your blood pressure. Be safe with medicines. Take your medicine exactly as prescribed. Call your doctor if you think you are having a problem with your medicine. · Talk to your doctor before you start taking aspirin every day. Aspirin can help certain people lower their risk of a heart attack or stroke. But taking aspirin isn't right for everyone, because it can cause serious bleeding. · See your doctor regularly. You may need to see the doctor more often at first or until your blood pressure comes down.   · If you are taking blood pressure medicine, talk to your doctor before you take decongestants or anti-inflammatory medicine, such as your body doesn't make enough thyroid hormone. This hormone helps your body use energy. If your thyroid level is low, you may feel tired, be constipated, have an increase in your blood pressure, or have dry skin or memory problems. You may also get cold easily, even when it is warm. Women with low thyroid levels may have heavy menstrual periods. A blood test to find your thyroid-stimulating hormone (TSH) level is used to check for hypothyroidism. A high TSH level may mean that you have it. The treatment for hypothyroidism is thyroid hormone pills. You should start to feel better in 1 to 2 weeks. Most people need treatment for the rest of their lives. You will need regular visits with your doctor to make sure you are doing well and that you have the right dose of medicine. Follow-up care is a key part of your treatment and safety. Be sure to make and go to all appointments, and call your doctor if you are having problems. It's also a good idea to know your test results and keep a list of the medicines you take. How can you care for yourself at home? · Take your thyroid hormone medicine exactly as prescribed. Call your doctor if you think you are having a problem with your medicine. Most people do not have side effects if they take the right amount of medicine regularly. ? Take the medicine 30 minutes before breakfast, and do not take it with calcium, vitamins, or iron. ? Do not take extra doses of your thyroid medicine. It will not help you get better any faster, and it may cause side effects. ? If you forget to take a dose, do NOT take a double dose of medicine. Take your usual dose the next day. · Tell your doctor about all prescription, herbal, or over-the-counter products you take. · Take care of yourself. Eat a healthy diet, get enough sleep, and get regular exercise. When should you call for help? Call 911 anytime you think you may need emergency care.  For example, call if:    · You passed out (lost consciousness).     · You have severe trouble breathing.     · You have a very slow heartbeat (less than 60 beats a minute).     · You have a low body temperature (95°F or below). Call your doctor now or seek immediate medical care if:    · You feel tired, sluggish, or weak.     · You have trouble remembering things or concentrating.     · You do not begin to feel better 2 weeks after starting your medicine. Watch closely for changes in your health, and be sure to contact your doctor if you have any problems. Where can you learn more? Go to [email or web address]://[employer]ing.[employer]. org and sign in to your [employer] account. Enter E067 in the "[employer] box to learn more about \"Hypothyroidism: Care Instructions. \"     If you do not have an account, please click on the \"Sign Up Now\" link. Current as of: March 31, 2020               Content Version: 12.9  © 2006-2021 [employer]. Care instructions adapted under license by [facility] ([facility]). If you have questions about a medical condition or this instruction, always ask your healthcare professional. [place] [address] any warranty or liability for your use of this information. Patient Education        Depression Treatment: Care Instructions  Your Care Instructions     Depression is a condition that affects the way you feel, think, and act. It causes symptoms such as low energy, loss of interest in daily activities, and sadness or grouchiness that goes on for a long time. Depression is very common and affects men and women of all ages. Depression is a medical illness caused by changes in the natural chemicals in your brain. It is not a character flaw, and it does not mean that you are a bad or weak person. It does not mean that you are going crazy. It is important to know that depression can be treated. Medicines, counseling, and self-care can all help.  Many people do not get help because they are embarrassed or think that they will get over the depression on their own. But some people do not get better without treatment. Follow-up care is a key part of your treatment and safety. Be sure to make and go to all appointments, and call your doctor if you are having problems. It's also a good idea to know your test results and keep a list of the medicines you take. How can you care for yourself at home? Learn about antidepressant medicines  Antidepressant medicines can improve or end the symptoms of depression. You may need to take the medicine for at least 6 months, and often longer. Keep taking your medicine even if you feel better. If you stop taking it too soon, your symptoms may come back or get worse. You may start to feel better within 1 to 3 weeks of taking antidepressant medicine. But it can take as many as 6 to 8 weeks to see more improvement. Talk to your doctor if you have problems with your medicine or if you do not notice any improvement after 3 weeks. Antidepressants can make you feel tired, dizzy, or nervous. Some people have dry mouth, constipation, headaches, sexual problems, an upset stomach, or diarrhea. Many of these side effects are mild and go away on their own after you take the medicine for a few weeks. Some may last longer. Talk to your doctor if side effects bother you too much. You might be able to try a different medicine. If you are pregnant or breastfeeding, talk to your doctor about what medicines you can take. Learn about counseling  In many cases, counseling can work as well as medicines to treat mild to moderate depression. Counseling is done by licensed mental health providers, such as psychologists, social workers, and some types of nurses. It can be done in one-on-one sessions or in a group setting. Many people find group sessions helpful. Cognitive-behavioral therapy is a type of counseling.  In this treatment, you learn how to see and change unhelpful thinking styles that may be and stroke. Your cholesterol levels help your doctor find out your risk for having a heart attack or stroke. You and your doctor can talk about whether you need to lower your risk and what treatment is best for you. Treatment options include a heart-healthy lifestyle and medicine. Both options can help lower your cholesterol and your risk. The way you choose to lower your risk will depend on how high your risk is for heart attack and stroke. It will also depend on how you feel about taking medicines. Follow-up care is a key part of your treatment and safety. Be sure to make and go to all appointments, and call your doctor if you are having problems. It's also a good idea to know your test results and keep a list of the medicines you take. How can you care for yourself at home? · Eat heart-healthy foods. ? Eat fruits, vegetables, whole grains, beans, and other high-fiber foods. ? Eat lean proteins, such as seafood, lean meats, beans, nuts, and soy products. ? Eat healthy fats, such as canola and olive oil. ? Choose foods that are low in saturated fat. ? Limit sodium and alcohol. ? Limit drinks and foods with added sugar. · Be physically active. Try to do moderate activity at least 2½ hours a week. Or try to do vigorous activity at least 1¼ hours a week. You may want to walk or try other activities, such as running, swimming, cycling, or playing tennis or team sports. · Stay at a healthy weight or lose weight by making the changes in eating and physical activity listed above. Losing just a small amount of weight, even 5 to 10 pounds, can help reduce your risk for having a heart attack or stroke. · Do not smoke. · Manage other health problems. These include diabetes and high blood pressure. If you think you may have a problem with alcohol or drug use, talk to your doctor. · If you take medicine, take it exactly as prescribed.  Call your doctor if you think you are having a problem with your medicine. · Check with your doctor or pharmacist before you use any other medicines, including over-the-counter medicines. Make sure your doctor knows all of the medicines, vitamins, herbal products, and supplements you take. Taking some medicines together can cause problems. When should you call for help? Watch closely for changes in your health, and be sure to contact your doctor if:    · You need help making lifestyle changes.     · You have questions about your medicine. Where can you learn more? Go to https://[email or web address].[employer]. org and sign in to your [employer] account. Enter E090 in the [employer] box to learn more about \"High Cholesterol: Care Instructions. \"     If you do not have an account, please click on the \"Sign Up Now\" link. Current as of: August 31, 2020               Content Version: 12.9  © 8625-1112 Healthwise, Incorporated. Care instructions adapted under license by [facility] ([facility]). If you have questions about a medical condition or this instruction, always ask your healthcare professional. [address] any warranty or liability for your use of this information.

## 2021-09-02 NOTE — PROGRESS NOTES
Mickey Osborn (:  1955) is a 60 y.o. male,Established patient, here for evaluation of the following chief complaint(s): Annual Exam (refills)         ASSESSMENT/PLAN:  1. Depression, unspecified depression type  -     sertraline (ZOLOFT) 50 MG tablet; TAKE 1 TABLET BY MOUTH DAILY. , Disp-30 tablet, R-11Normal  -     CBC Auto Differential; Future  -     Comprehensive Metabolic Panel; Future  2. Essential hypertension  -     enalapril (VASOTEC) 10 MG tablet; Take 1 tablet by mouth 2 times daily, Disp-60 tablet, R-11Normal  -     CBC Auto Differential; Future  -     Comprehensive Metabolic Panel; Future  3. Hypothyroidism, unspecified type  -     levothyroxine (SYNTHROID) 125 MCG tablet; TAKE 1 TABLET BY MOUTH DAILY, Disp-30 tablet, R-11Normal  -     TSH without Reflex; Future  -     T4, Free; Future  4. Hypertriglyceridemia  -     fenofibrate (TRIGLIDE) 160 MG tablet; Take 1 tablet by mouth daily, Disp-30 tablet, R-11Normal  -     Lipid Panel; Future  -     CBC Auto Differential; Future  -     Comprehensive Metabolic Panel; Future  5. Nocturia  -     PSA, Prostatic Specific Antigen; Future  Encouraged diet, exercise and weight loss. Reviewed health maintenance      No follow-ups on file. Subjective   SUBJECTIVE/OBJECTIVE:  HPI  Patient here today for a check up. Reviewed BMI of 34. Encouraged diet, exercise and weight loss. Needs med refills and blood work. Overall is feeling well. , non smoker,pmh reviewed. Review of Systems   Constitutional: Negative for chills, fatigue, fever and unexpected weight change. HENT: Negative for congestion, ear pain, rhinorrhea and sore throat. Eyes: Negative for pain and visual disturbance. Respiratory: Negative for cough, chest tightness, shortness of breath and wheezing. Cardiovascular: Negative for chest pain and palpitations.    Gastrointestinal: Negative for abdominal pain, blood in stool, constipation, diarrhea, nausea and vomiting. Genitourinary: Negative for difficulty urinating, frequency, hematuria and urgency. Musculoskeletal: Negative for back pain, joint swelling, myalgias and neck pain. Skin: Negative for rash. Neurological: Negative for dizziness and headaches. Hematological: Negative for adenopathy. Does not bruise/bleed easily. Psychiatric/Behavioral: Negative for behavioral problems and sleep disturbance. The patient is not nervous/anxious. Objective   Physical Exam  Vitals and nursing note reviewed. Constitutional:       Appearance: He is well-developed. HENT:      Head: Normocephalic and atraumatic. Right Ear: External ear normal.      Left Ear: External ear normal.      Nose: Nose normal.      Mouth/Throat:      Mouth: Mucous membranes are moist.   Eyes:      Pupils: Pupils are equal, round, and reactive to light. Neck:      Thyroid: No thyromegaly. Vascular: No carotid bruit. Cardiovascular:      Rate and Rhythm: Normal rate and regular rhythm. Heart sounds: Normal heart sounds. Pulmonary:      Breath sounds: Normal breath sounds. No wheezing or rales. Abdominal:      General: Bowel sounds are normal.      Palpations: Abdomen is soft. Tenderness: There is no abdominal tenderness. There is no guarding or rebound. Musculoskeletal:         General: Normal range of motion. Cervical back: Neck supple. Lymphadenopathy:      Cervical: No cervical adenopathy. Skin:     General: Skin is warm and dry. Findings: No rash. Neurological:      Mental Status: He is alert and oriented to person, place, and time. Cranial Nerves: No cranial nerve deficit. Deep Tendon Reflexes: Reflexes are normal and symmetric. An electronic signature was used to authenticate this note.     --Reyes Quiver, MD

## 2021-09-03 ENCOUNTER — TELEPHONE (OUTPATIENT)
Dept: FAMILY MEDICINE CLINIC | Age: 66
End: 2021-09-03

## 2021-09-03 LAB
ALBUMIN SERPL-MCNC: 4.4 G/DL (ref 3.5–5.1)
ALP BLD-CCNC: 35 U/L (ref 38–126)
ALT SERPL-CCNC: 14 U/L (ref 11–66)
ANION GAP SERPL CALCULATED.3IONS-SCNC: 7 MEQ/L (ref 8–16)
AST SERPL-CCNC: 27 U/L (ref 5–40)
BASOPHILS # BLD: 0.6 %
BASOPHILS ABSOLUTE: 0 THOU/MM3 (ref 0–0.1)
BILIRUB SERPL-MCNC: 0.6 MG/DL (ref 0.3–1.2)
BUN BLDV-MCNC: 21 MG/DL (ref 7–22)
CALCIUM SERPL-MCNC: 9.3 MG/DL (ref 8.5–10.5)
CHLORIDE BLD-SCNC: 103 MEQ/L (ref 98–111)
CHOLESTEROL, TOTAL: 118 MG/DL (ref 100–199)
CO2: 27 MEQ/L (ref 23–33)
CREAT SERPL-MCNC: 0.9 MG/DL (ref 0.4–1.2)
EOSINOPHIL # BLD: 2.3 %
EOSINOPHILS ABSOLUTE: 0.1 THOU/MM3 (ref 0–0.4)
ERYTHROCYTE [DISTWIDTH] IN BLOOD BY AUTOMATED COUNT: 12.6 % (ref 11.5–14.5)
ERYTHROCYTE [DISTWIDTH] IN BLOOD BY AUTOMATED COUNT: 39.9 FL (ref 35–45)
GFR SERPL CREATININE-BSD FRML MDRD: 85 ML/MIN/1.73M2
GLUCOSE BLD-MCNC: 85 MG/DL (ref 70–108)
HCT VFR BLD CALC: 40.9 % (ref 42–52)
HDLC SERPL-MCNC: 28 MG/DL
HEMOGLOBIN: 13.8 GM/DL (ref 14–18)
IMMATURE GRANS (ABS): 0.01 THOU/MM3 (ref 0–0.07)
IMMATURE GRANULOCYTES: 0.3 %
LDL CHOLESTEROL CALCULATED: 65 MG/DL
LYMPHOCYTES # BLD: 41.5 %
LYMPHOCYTES ABSOLUTE: 1.3 THOU/MM3 (ref 1–4.8)
MCH RBC QN AUTO: 29.6 PG (ref 26–33)
MCHC RBC AUTO-ENTMCNC: 33.7 GM/DL (ref 32.2–35.5)
MCV RBC AUTO: 87.8 FL (ref 80–94)
MONOCYTES # BLD: 8.4 %
MONOCYTES ABSOLUTE: 0.3 THOU/MM3 (ref 0.4–1.3)
NUCLEATED RED BLOOD CELLS: 0 /100 WBC
PLATELET # BLD: 106 THOU/MM3 (ref 130–400)
PMV BLD AUTO: 11.6 FL (ref 9.4–12.4)
POTASSIUM SERPL-SCNC: 3.8 MEQ/L (ref 3.5–5.2)
PROSTATE SPECIFIC ANTIGEN: 1.13 NG/ML (ref 0–1)
RBC # BLD: 4.66 MILL/MM3 (ref 4.7–6.1)
SEG NEUTROPHILS: 46.9 %
SEGMENTED NEUTROPHILS ABSOLUTE COUNT: 1.5 THOU/MM3 (ref 1.8–7.7)
SODIUM BLD-SCNC: 137 MEQ/L (ref 135–145)
T4 FREE: 1.34 NG/DL (ref 0.93–1.76)
TOTAL PROTEIN: 7 G/DL (ref 6.1–8)
TRIGL SERPL-MCNC: 125 MG/DL (ref 0–199)
TSH SERPL DL<=0.05 MIU/L-ACNC: 0.01 UIU/ML (ref 0.4–4.2)
WBC # BLD: 3.1 THOU/MM3 (ref 4.8–10.8)

## 2021-09-03 NOTE — TELEPHONE ENCOUNTER
----- Message from Teressa Todd MD sent at 9/3/2021 12:06 PM EDT -----  Thyroid levels are good. CMP is good. Cholesterol at 118 with normal CRR. CBC is stable. PSA is good. Continue present.

## 2021-09-23 ENCOUNTER — TELEPHONE (OUTPATIENT)
Dept: FAMILY MEDICINE CLINIC | Age: 66
End: 2021-09-23

## 2021-09-23 NOTE — TELEPHONE ENCOUNTER
Pt insurance will not longer cover synthroid/levothyroxine.  Pt can go to  and fill for $4.00. detailed message on Pt VM

## 2022-02-08 ENCOUNTER — TELEPHONE (OUTPATIENT)
Dept: FAMILY MEDICINE CLINIC | Age: 67
End: 2022-02-08

## 2022-02-08 ASSESSMENT — PATIENT HEALTH QUESTIONNAIRE - PHQ9
SUM OF ALL RESPONSES TO PHQ9 QUESTIONS 1 & 2: 2
SUM OF ALL RESPONSES TO PHQ QUESTIONS 1-9: 2
1. LITTLE INTEREST OR PLEASURE IN DOING THINGS: 1
2. FEELING DOWN, DEPRESSED OR HOPELESS: 1
SUM OF ALL RESPONSES TO PHQ QUESTIONS 1-9: 2

## 2022-02-08 ASSESSMENT — LIFESTYLE VARIABLES: HOW OFTEN DO YOU HAVE A DRINK CONTAINING ALCOHOL: 0

## 2022-02-08 NOTE — TELEPHONE ENCOUNTER
Lizzette called, states family would like Pt to go through rehab in their facility. They are requesting records and last OV note. Pt will need an appointment. I spoke with Pt, his wife and sister-in-law.  Appointment scheduled tomorrow @ 9:45, AWV questions answered

## 2022-02-09 ENCOUNTER — OFFICE VISIT (OUTPATIENT)
Dept: FAMILY MEDICINE CLINIC | Age: 67
End: 2022-02-09
Payer: MEDICARE

## 2022-02-09 VITALS
WEIGHT: 234.4 LBS | TEMPERATURE: 97.3 F | BODY MASS INDEX: 31.75 KG/M2 | OXYGEN SATURATION: 95 % | DIASTOLIC BLOOD PRESSURE: 74 MMHG | SYSTOLIC BLOOD PRESSURE: 124 MMHG | HEART RATE: 71 BPM | RESPIRATION RATE: 18 BRPM | HEIGHT: 72 IN

## 2022-02-09 DIAGNOSIS — Z00.00 ROUTINE GENERAL MEDICAL EXAMINATION AT A HEALTH CARE FACILITY: Primary | ICD-10-CM

## 2022-02-09 DIAGNOSIS — D69.6 THROMBOCYTOPENIA (HCC): ICD-10-CM

## 2022-02-09 DIAGNOSIS — G40.909 SEIZURE DISORDER (HCC): ICD-10-CM

## 2022-02-09 PROCEDURE — G8484 FLU IMMUNIZE NO ADMIN: HCPCS | Performed by: FAMILY MEDICINE

## 2022-02-09 PROCEDURE — 3017F COLORECTAL CA SCREEN DOC REV: CPT | Performed by: FAMILY MEDICINE

## 2022-02-09 PROCEDURE — 1123F ACP DISCUSS/DSCN MKR DOCD: CPT | Performed by: FAMILY MEDICINE

## 2022-02-09 PROCEDURE — G0438 PPPS, INITIAL VISIT: HCPCS | Performed by: FAMILY MEDICINE

## 2022-02-09 PROCEDURE — 4040F PNEUMOC VAC/ADMIN/RCVD: CPT | Performed by: FAMILY MEDICINE

## 2022-02-09 ASSESSMENT — PATIENT HEALTH QUESTIONNAIRE - PHQ9
SUM OF ALL RESPONSES TO PHQ QUESTIONS 1-9: 2
1. LITTLE INTEREST OR PLEASURE IN DOING THINGS: 1
SUM OF ALL RESPONSES TO PHQ QUESTIONS 1-9: 2
2. FEELING DOWN, DEPRESSED OR HOPELESS: 1
SUM OF ALL RESPONSES TO PHQ9 QUESTIONS 1 & 2: 2

## 2022-02-09 NOTE — PATIENT INSTRUCTIONS
Personalized Preventive Plan for Raphael Kirkpatrick - 2/9/2022  Medicare offers a range of preventive health benefits. Some of the tests and screenings are paid in full while other may be subject to a deductible, co-insurance, and/or copay. Some of these benefits include a comprehensive review of your medical history including lifestyle, illnesses that may run in your family, and various assessments and screenings as appropriate. After reviewing your medical record and screening and assessments performed today your provider may have ordered immunizations, labs, imaging, and/or referrals for you. A list of these orders (if applicable) as well as your Preventive Care list are included within your After Visit Summary for your review. Other Preventive Recommendations:    · A preventive eye exam performed by an eye specialist is recommended every 1-2 years to screen for glaucoma; cataracts, macular degeneration, and other eye disorders. · A preventive dental visit is recommended every 6 months. · Try to get at least 150 minutes of exercise per week or 10,000 steps per day on a pedometer . · Order or download the FREE \"Exercise & Physical Activity: Your Everyday Guide\" from [employer]. Call 6-768.425.2795 or search [employer] online. · You need 5721-3254 mg of calcium and 7755-2253 IU of vitamin D per day. It is possible to meet your calcium requirement with diet alone, but a vitamin D supplement is usually necessary to meet this goal.  · When exposed to the sun, use a sunscreen that protects against both UVA and UVB radiation with an SPF of 30 or greater. Reapply every 2 to 3 hours or after sweating, drying off with a towel, or swimming. · Always wear a seat belt when traveling in a car. Always wear a helmet when riding a bicycle or motorcycle. Patient Education        Well Visit, Over 72: Care Instructions  Overview     Well visits can help you stay healthy. Your doctor has checked your overall health and may have suggested ways to take good care of yourself. Your doctor also may have recommended tests. At home, you can help prevent illness with healthy eating, regular exercise, and other steps. Follow-up care is a key part of your treatment and safety. Be sure to make and go to all appointments, and call your doctor if you are having problems. It's also a good idea to know your test results and keep a list of the medicines you take. How can you care for yourself at home? Get screening tests that you and your doctor decide on. Screening helps find diseases before any symptoms appear. Eat healthy foods. Choose fruits, vegetables, whole grains, protein, and low-fat dairy foods. Limit fat, especially saturated fat. Reduce salt in your diet. Limit alcohol. If you are a man, have no more than 2 drinks a day or 14 drinks a week. If you are a woman, have no more than 1 drink a day or 7 drinks a week. Since alcohol affects older adults differently, you may want to limit alcohol even more. Or you may not want to drink at all. Get at least 30 minutes of exercise on most days of the week. Walking is a good choice. You also may want to do other activities, such as running, swimming, cycling, or playing tennis or team sports. Reach and stay at a healthy weight. This will lower your risk for many problems, such as obesity, diabetes, heart disease, and high blood pressure. Do not smoke. Smoking can make health problems worse. If you need help quitting, talk to your doctor about stop-smoking programs and medicines. These can increase your chances of quitting for good. Care for your mental health. It is easy to get weighed down by worry and stress. Learn strategies to manage stress, like deep breathing and mindfulness, and stay connected with your family and community. If you find you often feel sad or hopeless, talk with your doctor. Treatment can help.   Talk to your doctor about whether you have any risk factors for sexually transmitted infections (STIs). You can help prevent STIs if you wait to have sex with a new partner (or partners) until you've each been tested for STIs. It also helps if you use condoms (male or female condoms) and if you limit your sex partners to one person who only has sex with you. Vaccines are available for some STIs. If you think you may have a problem with alcohol or drug use, talk to your doctor. This includes prescription medicines (such as amphetamines and opioids) and illegal drugs (such as cocaine and methamphetamine). Your doctor can help you figure out what type of treatment is best for you. Protect your skin from too much sun. When you're outdoors from 10 a.m. to 4 p.m., stay in the shade or cover up with clothing and a hat with a wide brim. Wear sunglasses that block UV rays. Even when it's cloudy, put broad-spectrum sunscreen (SPF 30 or higher) on any exposed skin. See a dentist one or two times a year for checkups and to have your teeth cleaned. Wear a seat belt in the car. When should you call for help? Watch closely for changes in your health, and be sure to contact your doctor if you have any problems or symptoms that concern you. Where can you learn more? Go to https://[employer].comlaishaeb.[email or web address] org and sign in to your [employer] account. Enter Z347 in the [facility] box to learn more about \"Well Visit, Over 65: Care Instructions. \"     If you do not have an account, please click on the \"Sign Up Now\" link. Current as of: October 6, 2021               Content Version: 13.1  © 8897-2858 Healthwise, Incorporated. Care instructions adapted under license by [facility] ([facility]). If you have questions about a medical condition or this instruction, always ask your healthcare professional. [address] any warranty or liability for your use of this information.

## 2022-02-09 NOTE — PROGRESS NOTES
Medicare Annual Wellness Visit  Name: Moni Benoit Date: 2022   MRN: [id number] Sex: Male   Age: 73 y.o. Ethnicity: Non- / Non    : 1955 Race: White (non-)      Shirin Kim is here for Medicare AWV    Screenings for behavioral, psychosocial and functional/safety risks, and cognitive dysfunction are all negative except as indicated below. These results, as well as other patient data from the [address] form, are documented in Flowsheets linked to this Encounter. Allergies   Allergen Reactions    Escitalopram Oxalate      seizure    Hydrochlorothiazide Hives    Other      Hay fever    Tetanus Toxoids      Faints    Wellbutrin [Bupropion Hcl] Other (See Comments)     Seizure    Tegretol [Carbamazepine] Rash         Prior to Visit Medications    Medication Sig Taking? Authorizing Provider   levothyroxine (SYNTHROID) 125 MCG tablet TAKE 1 TABLET BY MOUTH DAILY Yes Lorenza Guerrero MD   sertraline (ZOLOFT) 50 MG tablet TAKE 1 TABLET BY MOUTH DAILY. Yes Lorenza Guerrero MD   enalapril (VASOTEC) 10 MG tablet Take 1 tablet by mouth 2 times daily Yes Lorenza Guerrero MD   fenofibrate (TRIGLIDE) 160 MG tablet Take 1 tablet by mouth daily Yes Lorenza Guerrero MD   Handicap Placard MISC by Does not apply route Request parking placard due to medical conditions. Duration of 5 years.  Yes Lorenza Guerrero MD   magnesium oxide (MAG-OX) 400 MG tablet Take 400 mg by mouth daily Yes Historical Provider, MD   OXcarbazepine (TRILEPTAL) 300 MG tablet Take 300 mg by mouth 2 times daily Yes Historical Provider, MD   Lutein 20 MG CAPS Take by mouth Yes Historical Provider, MD   Coenzyme Q10 (CO Q-10) 200 MG CAPS Take by mouth daily Yes Historical Provider, MD   loratadine (CLARITIN) 10 MG tablet Take 10 mg by mouth daily Yes Historical Provider, MD   Phenylephrine-APAP-Guaifenesin (TYLENOL SINUS SEVERE PO) Take by mouth as needed Yes Historical Provider, MD KRILL OIL PO Take 300 mg by mouth daily Aguilar Red Omega-3 Krill Oil- 300 mg Yes Historical Provider, MD   acetaminophen (TYLENOL) 500 MG tablet Take 500 mg by mouth every 6 hours as needed for Pain Yes Historical Provider, MD   levETIRAcetam (KEPPRA) 500 MG tablet Take 1,500 mg by mouth 2 times daily  Yes Historical Provider, MD   multivitamin (THERAGRAN) per tablet Take 1 tablet by mouth daily. Yes Historical Provider, MD   UNABLE TO FIND Vitamin D3 2000 IU  Yes Historical Provider, MD   Famotidine (PEPCID AC PO) Take 20 mg by mouth. Yes Historical Provider, MD   Probiotic Product (PROBIOTIC ACIDOPHILUS PO) Take 1 tablet by mouth 2 times daily. Yes Historical Provider, MD   Handicap Placard MISC by Does not apply route Request parking placard due to medical conditions. Duration of 5 years.   Sherrel Frankel, MD         Past Medical History:   Diagnosis Date    Blindness     Latasha filter in place     HTN (hypertension)     Hypothyroidism     Multiple trauma 1990    S/P  shunt     Seizure disorder ([address]     Thrombocytopenia ([address]     Thyroid nodule        Past Surgical History:   Procedure Laterality Date    EYE REMOVAL      Rt eye    GALLBLADDER SURGERY      HERNIA REPAIR      WRIST CLOSED REDUCTION Right 1/24/2020    RIGHT WRIST CLOSED REDUCTION PERCUTANEOUS PINNING performed by Nidhi Pascual MD at [address] History   Problem Relation Age of Onset    Diabetes Mother     High Blood Pressure Mother     Cancer Mother         breast    Heart Disease Mother     COPD Father     High Blood Pressure Father     Diabetes Father     Hearing Loss Father     Asthma Maternal Uncle     Diabetes Maternal Uncle     Hearing Loss Maternal Uncle     Depression Maternal Grandmother     Arthritis Maternal Grandfather     Hearing Loss Paternal Grandfather     Birth Defects Neg Hx     Early Death Neg Hx     High Cholesterol Neg Hx     Kidney Disease Neg Hx     Learning Disabilities Neg Hx     Mental Illness Neg Hx     Mental Retardation Neg Hx     Miscarriages / Stillbirths Neg Hx     Stroke Neg Hx     Substance Abuse Neg Hx     Vision Loss Neg Hx     Other Neg Hx        CareTeam (Including outside providers/suppliers regularly involved in providing care):   Patient Care Team:  Pan Zavala MD as PCP - General (Family Medicine)  Pan Zavala MD as PCP - [facility] Bryan Provider    Wt Readings from Last 3 Encounters:   02/09/22 234 lb 6.4 oz (106.3 kg)   09/02/21 239 lb 12.8 oz (108.8 kg)   09/16/20 250 lb (113.4 kg)     Vitals:    02/09/22 0955   BP: 124/74   Pulse: 71   Resp: 18   Temp: 97.3 °F (36.3 °C)   TempSrc: Infrared   SpO2: 95%   Weight: 234 lb 6.4 oz (106.3 kg)   Height: 6' (1.829 m)     Body mass index is 31.79 kg/m². Based upon direct observation of the patient, evaluation of cognition reveals recent and remote memory intact.     General Appearance: alert and oriented to person, place and time, well developed and well- nourished, in no acute distress  Skin: warm and dry, no rash or erythema  Head: normocephalic and atraumatic  Eyes: pupils equal, round, and reactive to light, extraocular eye movements intact, conjunctivae normal  ENT: tympanic membrane, external ear and ear canal normal bilaterally, nose without deformity, nasal mucosa and turbinates normal without polyps  Neck: supple and non-tender without mass, no thyromegaly or thyroid nodules, no cervical lymphadenopathy  Pulmonary/Chest: clear to auscultation bilaterally- no wheezes, rales or rhonchi, normal air movement, no respiratory distress  Cardiovascular: normal rate, regular rhythm, normal S1 and S2, no murmurs, rubs, clicks, or gallops, distal pulses intact, no carotid bruits  Abdomen: soft, non-tender, non-distended, normal bowel sounds, no masses or organomegaly  Extremities: no cyanosis, clubbing or edema  Musculoskeletal: normal range of motion, no joint swelling, deformity or tenderness  Neurologic: reflexes normal and symmetric, no cranial nerve deficit, gait, coordination and speech normal, left sided facial droop. Patient's complete Health Risk Assessment and screening values have been reviewed and are found in Flowsheets. The following problems were reviewed today and where indicated follow up appointments were made and/or referrals ordered. Positive Risk Factor Screenings with Interventions:     Fall Risk:  Timed Up and Go Test > 12 seconds? (Complete if either Fall Risk answers are Yes): no  2 or more falls in past year?: (!) yes  Fall with injury in past year?: (!) yes  Fall Risk Interventions:    · Home safety tips provided            General Health and ACP:  General  In general, how would you say your health is?: Good  In the past 7 days, have you experienced any of the following?  New or Increased Pain, New or Increased Fatigue, Loneliness, Social Isolation, Stress or Anger?: (!) New or Increased Fatigue,Loneliness,Social Isolation,Stress,Anger  Do you get the social and emotional support that you need?: Yes  Do you have a Living Will?: (!) No  Advance Directives     Power of  Living Will ACP-Advance Directive ACP-Power of     Not on File Not on File Not on File Not on File      General Health Risk Interventions:  · Social isolation: patient declines any further intervention for this issue  · No Living Will: Advance Care Planning addressed with patient today    Health Habits/Nutrition:  Health Habits/Nutrition  Do you exercise for at least 20 minutes 2-3 times per week?: (!) No  Have you lost any weight without trying in the past 3 months?: No  Do you eat only one meal per day?: No  Have you seen the dentist within the past year?: Yes  Body mass index: (!) 31.79  Health Habits/Nutrition Interventions:  · Inadequate physical activity:  educational materials provided to promote increased physical activity  · Nutritional issues:  educational materials for healthy, well-balanced diet provided    Hearing/Vision:  No exam data present  Hearing/Vision  Do you or your family notice any trouble with your hearing that hasn't been managed with hearing aids?: No  Do you have difficulty driving, watching TV, or doing any of your daily activities because of your eyesight?: (!) Yes  Have you had an eye exam within the past year?: (!) No  Hearing/Vision Interventions:  · Vision concerns:  patient declines any further evaluation/treatment for this issue     ADL:  ADLs  In the past 7 days, did you need help from others to perform any of the following everyday activities? Eating, dressing, grooming, bathing, toileting, or walking/balance?: (!) Walking/Balance (dizziness)  In the past 7 days, did you need help from others to take care of any of the following?  Laundry, housekeeping, banking/finances, shopping, telephone use, food preparation, transportation, or taking medications?: (!) Laundry,Housekeeping,Banking/Finances,Shopping,Food Preparation,Transportation,Taking Medications  ADL Interventions:  · family helps out      Personalized Preventive Plan   Current Health Maintenance Status  Immunization History   Administered Date(s) Administered    COVID-19, J&J, PF, 0.5 mL 03/11/2021    Hepatitis B 07/12/2012, 09/05/2012, 02/07/2013    Influenza Virus Vaccine 10/24/2019, 10/24/2019    Influenza Whole 10/15/2010    Influenza, Quadv, Recombinant, IM PF (Flublok 18 yrs and older) 11/20/2019    Pneumococcal Polysaccharide (Inonrqvxd06) 08/25/2017    Tdap (Boostrix, Adacel) 05/28/2016    Zoster Live (Zostavax) 02/16/2016        Health Maintenance   Topic Date Due    HIV screen  Never done    Shingles Vaccine (2 of 3) 04/12/2016    COVID-19 Vaccine (2 - Booster for Ezoic series) 05/06/2021    Flu vaccine (1) 09/01/2021    Annual Wellness Visit (AWV)  Never done    Pneumococcal 65+ years Vaccine (1 of 1 - PPSV23) 08/25/2022    TSH testing  09/02/2022    Potassium monitoring  09/02/2022    Creatinine monitoring  09/02/2022    Depression Monitoring  02/08/2023    Colon cancer screen colonoscopy  02/24/2026    Lipid screen  09/02/2026    Hepatitis C screen  Completed    Hepatitis A vaccine  Aged Out    Hepatitis B vaccine  Aged Out    Hib vaccine  Aged Out    Meningococcal (ACWY) vaccine  Aged Out     Recommendations for [employer] Due: see orders and patient instructions/AVS.  . Recommended screening schedule for the next 5-10 years is provided to the patient in written form: see Patient Dae Connors was seen today for medicare aw. Diagnoses and all orders for this visit:    Routine general medical examination at a health care facility    Seizure disorder [facility])  -recent seizure, sees neurology    Thrombocytopenia ([facility]  -stable,   Lab Results   Component Value Date    WBC 3.1 (L) 09/02/2021    HGB 13.8 (L) 09/02/2021    HCT 40.9 (L) 09/02/2021    MCV 87.8 09/02/2021     (L) 09/02/2021     Wife with knee replacement. Preston Mention is blind, wife is 24/7 caregiver. Needs assistance. Recent seizure, frequent falls. New facial droop and left arm deficits. Recommend NH placement with wife and therapies.   Electronically signed by Christine Sepulveda MD on 2/9/2022 at 12:29 PM

## 2022-02-11 ENCOUNTER — OUTSIDE SERVICES (OUTPATIENT)
Dept: FAMILY MEDICINE CLINIC | Age: 67
End: 2022-02-11
Payer: MEDICARE

## 2022-02-11 DIAGNOSIS — F32.A DEPRESSION, UNSPECIFIED DEPRESSION TYPE: ICD-10-CM

## 2022-02-11 DIAGNOSIS — D69.6 THROMBOCYTOPENIA (HCC): ICD-10-CM

## 2022-02-11 DIAGNOSIS — G40.909 SEIZURE DISORDER (HCC): Primary | ICD-10-CM

## 2022-02-11 PROCEDURE — 99304 1ST NF CARE SF/LOW MDM 25: CPT | Performed by: FAMILY MEDICINE

## 2022-02-11 NOTE — PROGRESS NOTES
2/11/2022  Vielka Mathis  1955  Subjective: Patient is an 73 y.o. male. He is    He is a Non-smoker. He is a new patient here at [address] [place] [address] home. He presents to us from home. He is blind and his wife who is his caretaker had her knee replaced and is in NH. He has had recent seizure and fall. He denies Headaches, chest pain, SOB, or abdominal pain. He does have a Past Medical History of   Past Medical History:   Diagnosis Date    Blindness     Dawson filter in place     HTN (hypertension)     Hypothyroidism     Multiple trauma 1990    S/P  shunt     Seizure disorder (HCC)     Thrombocytopenia (HCC)     Thyroid nodule    . He has a past surgical history of   Past Surgical History:   Procedure Laterality Date    EYE REMOVAL      Rt eye    GALLBLADDER SURGERY      HERNIA REPAIR      WRIST CLOSED REDUCTION Right 1/24/2020    RIGHT WRIST CLOSED REDUCTION PERCUTANEOUS PINNING performed by Elizabeth King MD at [place] [facility]   . He has the following allergies: Escitalopram oxalate, Hydrochlorothiazide, Other, Tetanus toxoids, Wellbutrin [bupropion hcl], and Tegretol [carbamazepine]. Objective: Please see vitals per the patient's chart. He is well developed and well nourished and in no acute distress. The head is normocephalic/atraumatic. The pupils are round, equal, reactive to light and accommodation and extraocular muscles are intact. There is no sinus tenderness to palpation. The ears were clear. Nares were normal.  The throat is clear with no cervical lymphadenopathy and no carotid bruits were appreciated. The lungs were clear. Heart rate and rhythm regular without murmur. The abdomen is soft and nontender. Extremities are without edema. Neurologically alert and oriented. The cranial nerves are grossly intact with no sensory motor deficits. The skin reveals no rashes or lesions. Blind. Assessment:    Diagnosis Orders   1.  Seizure disorder ([address]  Stable on keppra and oxcarbazepine   2. Thrombocytopenia (HCC)  [place],   Lab Results   Component Value Date    WBC 3.1 (L) 09/02/2021    HGB 13.8 (L) 09/02/2021    HCT 40.9 (L) 09/02/2021    MCV 87.8 09/02/2021     (L) 09/02/2021        3. Depression, unspecified depression type  Stable on zoloft     Plan: We will continue current medication regimen including keppra and oxcarbazepine for seizures, zoloft for depressionand supportive care and recheck in 1 month.        Electronically signed by Rosio Walsh MD on 2/11/2022 at 1:02 PM

## 2022-02-24 ENCOUNTER — OUTSIDE SERVICES (OUTPATIENT)
Dept: FAMILY MEDICINE CLINIC | Age: 67
End: 2022-02-24
Payer: MEDICARE

## 2022-02-24 DIAGNOSIS — I10 ESSENTIAL HYPERTENSION: ICD-10-CM

## 2022-02-24 DIAGNOSIS — G40.909 SEIZURE DISORDER (HCC): Primary | ICD-10-CM

## 2022-02-24 DIAGNOSIS — F32.A DEPRESSION, UNSPECIFIED DEPRESSION TYPE: ICD-10-CM

## 2022-02-24 PROCEDURE — 99307 SBSQ NF CARE SF MDM 10: CPT | Performed by: FAMILY MEDICINE

## 2022-02-24 NOTE — PROGRESS NOTES
2/24/2022  Jacinto Flaming  1955  Subjective:  He was in his/her room to follow up seizure disorder. Is doing well, no new problems, plans to go home tomorrow. Objective:   Please see vitals per chart. There is no sinus tenderness to palpation, no cervical lymphadenopathy. Lungs are clear. Heart Regular rate and rhythm without murmur. The abdomen is soft and nontender. Extremities are without edema. Assessment:   Diagnosis Orders   1. Seizure disorder ([address]  -stable on keppra, oxcarbazepine   2. Depression, unspecified depression type  -stable on zoloft   3. Essential hypertension  -stable on vasotec     Plan: We will continue current medication regimen including zoloft for depression and keppra and oxcarbazepine for seizures. and supportive care and recheck in 1 month. Home tomorrow.   Electronically signed by Elissa Wellington MD on 2/24/2022 at 11:11 AM.

## 2022-02-28 ENCOUNTER — TELEPHONE (OUTPATIENT)
Dept: FAMILY MEDICINE CLINIC | Age: 67
End: 2022-02-28

## 2022-02-28 NOTE — TELEPHONE ENCOUNTER
Nitin Schwarz from [facility] called. Pt was discharged home from SNF. Called to confirm you will follow for HH-skilled nursing, PT and OT. Ok to follow?

## 2022-03-24 ENCOUNTER — TELEPHONE (OUTPATIENT)
Dept: FAMILY MEDICINE CLINIC | Age: 67
End: 2022-03-24

## 2022-03-24 NOTE — TELEPHONE ENCOUNTER
CINDY [place] [facility] Carla Witt from [facility] called to report a fall. Pt fell yesterday 3/23/22 outside while getting the mail. No injury noted. If any questions or orders can call Carla Witt at [phone].

## 2022-06-17 ENCOUNTER — TELEPHONE (OUTPATIENT)
Dept: FAMILY MEDICINE CLINIC | Age: 67
End: 2022-06-17

## 2022-06-17 NOTE — TELEPHONE ENCOUNTER
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 I called and spoke with Luis Carlos Ramirez, informed her to contact the office where they take care of the prosthetic eye for advice. Appointment scheduled with CARY on Monday.

## 2022-09-03 DIAGNOSIS — E78.1 HYPERTRIGLYCERIDEMIA: ICD-10-CM

## 2022-09-03 DIAGNOSIS — E03.9 HYPOTHYROIDISM, UNSPECIFIED TYPE: ICD-10-CM

## 2022-09-03 DIAGNOSIS — F32.A DEPRESSION, UNSPECIFIED DEPRESSION TYPE: ICD-10-CM

## 2022-09-03 DIAGNOSIS — I10 ESSENTIAL HYPERTENSION: ICD-10-CM

## 2022-09-06 RX ORDER — FENOFIBRATE 160 MG/1
TABLET ORAL
Qty: 30 TABLET | Refills: 11 | OUTPATIENT
Start: 2022-09-06

## 2022-09-06 RX ORDER — ENALAPRIL MALEATE 10 MG/1
TABLET ORAL
Qty: 60 TABLET | Refills: 11 | OUTPATIENT
Start: 2022-09-06

## 2022-09-06 RX ORDER — LEVOTHYROXINE SODIUM 0.12 MG/1
TABLET ORAL
Qty: 30 TABLET | Refills: 11 | OUTPATIENT
Start: 2022-09-06

## 2022-09-06 NOTE — TELEPHONE ENCOUNTER
[facility] Form called requesting a refill on the following medications:  Requested Prescriptions     Pending Prescriptions Disp Refills    fenofibrate (TRIGLIDE) 160 MG tablet [Pharmacy Med Name: FENOFIBRATE 160MG TABLET] 30 tablet 11     Sig: TAKE ONE (1) TABLET BY MOUTH DAILY    enalapril (VASOTEC) 10 MG tablet [Pharmacy Med Name: ENALAPRIL MALEATE 10MG TABLET] 60 tablet 11     Sig: TAKE ONE (1) TABLET BY MOUTH TWO (2) TIMES DAILY    sertraline (ZOLOFT) 50 MG tablet [Pharmacy Med Name: SERTRALINE HCL 50MG TABLET] 30 tablet 11     Sig: TAKE ONE (1) TABLET BY MOUTH DAILY.     levothyroxine (SYNTHROID) 125 MCG tablet [Pharmacy Med Name: LEVOTHYROXINE SODIUM 125MCG TABLET] 30 tablet 11     Sig: TAKE ONE (1) TABLET BY MOUTH DAILY       Date of last visit: 2/9/2022  Date of next visit (if applicable):Visit date not found  Date of last refill: 9/2/2021  Pharmacy Name: [facility]      Thanks,  [id number] Byrd

## 2022-09-08 ENCOUNTER — OFFICE VISIT (OUTPATIENT)
Dept: FAMILY MEDICINE CLINIC | Age: 67
End: 2022-09-08
Payer: MEDICARE

## 2022-09-08 VITALS
SYSTOLIC BLOOD PRESSURE: 126 MMHG | HEART RATE: 85 BPM | RESPIRATION RATE: 18 BRPM | OXYGEN SATURATION: 98 % | BODY MASS INDEX: 32.96 KG/M2 | WEIGHT: 243 LBS | DIASTOLIC BLOOD PRESSURE: 86 MMHG

## 2022-09-08 DIAGNOSIS — I10 ESSENTIAL HYPERTENSION: Primary | ICD-10-CM

## 2022-09-08 DIAGNOSIS — E78.1 HYPERTRIGLYCERIDEMIA: ICD-10-CM

## 2022-09-08 DIAGNOSIS — F32.A DEPRESSION, UNSPECIFIED DEPRESSION TYPE: ICD-10-CM

## 2022-09-08 DIAGNOSIS — R35.1 NOCTURIA: ICD-10-CM

## 2022-09-08 DIAGNOSIS — Z23 NEED FOR PNEUMOCOCCAL VACCINATION: ICD-10-CM

## 2022-09-08 DIAGNOSIS — F33.1 MAJOR DEPRESSIVE DISORDER, RECURRENT, MODERATE (HCC): ICD-10-CM

## 2022-09-08 DIAGNOSIS — E03.9 HYPOTHYROIDISM, UNSPECIFIED TYPE: ICD-10-CM

## 2022-09-08 LAB
ALBUMIN SERPL-MCNC: 4 G/DL (ref 3.5–5.1)
ALP BLD-CCNC: 41 U/L (ref 38–126)
ALT SERPL-CCNC: 16 U/L (ref 11–66)
ANION GAP SERPL CALCULATED.3IONS-SCNC: 9 MEQ/L (ref 8–16)
AST SERPL-CCNC: 30 U/L (ref 5–40)
BASOPHILS # BLD: 0.7 %
BASOPHILS ABSOLUTE: 0 THOU/MM3 (ref 0–0.1)
BILIRUB SERPL-MCNC: 0.4 MG/DL (ref 0.3–1.2)
BUN BLDV-MCNC: 18 MG/DL (ref 7–22)
CALCIUM SERPL-MCNC: 8.8 MG/DL (ref 8.5–10.5)
CHLORIDE BLD-SCNC: 99 MEQ/L (ref 98–111)
CHOLESTEROL, TOTAL: 113 MG/DL (ref 100–199)
CO2: 25 MEQ/L (ref 23–33)
CREAT SERPL-MCNC: 0.7 MG/DL (ref 0.4–1.2)
EOSINOPHIL # BLD: 2.3 %
EOSINOPHILS ABSOLUTE: 0.1 THOU/MM3 (ref 0–0.4)
ERYTHROCYTE [DISTWIDTH] IN BLOOD BY AUTOMATED COUNT: 12.8 % (ref 11.5–14.5)
ERYTHROCYTE [DISTWIDTH] IN BLOOD BY AUTOMATED COUNT: 38.8 FL (ref 35–45)
GFR SERPL CREATININE-BSD FRML MDRD: > 90 ML/MIN/1.73M2
GLUCOSE BLD-MCNC: 111 MG/DL (ref 70–108)
HCT VFR BLD CALC: 38.8 % (ref 42–52)
HDLC SERPL-MCNC: 26 MG/DL
HEMOGLOBIN: 13.8 GM/DL (ref 14–18)
IMMATURE GRANS (ABS): 0.02 THOU/MM3 (ref 0–0.07)
IMMATURE GRANULOCYTES: 0.7 %
LDL CHOLESTEROL CALCULATED: 46 MG/DL
LYMPHOCYTES # BLD: 36.1 %
LYMPHOCYTES ABSOLUTE: 1.1 THOU/MM3 (ref 1–4.8)
MCH RBC QN AUTO: 29.9 PG (ref 26–33)
MCHC RBC AUTO-ENTMCNC: 35.6 GM/DL (ref 32.2–35.5)
MCV RBC AUTO: 84.2 FL (ref 80–94)
MONOCYTES # BLD: 10.9 %
MONOCYTES ABSOLUTE: 0.3 THOU/MM3 (ref 0.4–1.3)
NUCLEATED RED BLOOD CELLS: 0 /100 WBC
PLATELET # BLD: 102 THOU/MM3 (ref 130–400)
PMV BLD AUTO: 11.5 FL (ref 9.4–12.4)
POTASSIUM SERPL-SCNC: 3.9 MEQ/L (ref 3.5–5.2)
RBC # BLD: 4.61 MILL/MM3 (ref 4.7–6.1)
SEG NEUTROPHILS: 49.3 %
SEGMENTED NEUTROPHILS ABSOLUTE COUNT: 1.5 THOU/MM3 (ref 1.8–7.7)
SODIUM BLD-SCNC: 133 MEQ/L (ref 135–145)
T4 FREE: 1.46 NG/DL (ref 0.93–1.76)
TOTAL PROTEIN: 6.3 G/DL (ref 6.1–8)
TRIGL SERPL-MCNC: 207 MG/DL (ref 0–199)
TSH SERPL DL<=0.05 MIU/L-ACNC: < 0.005 UIU/ML (ref 0.4–4.2)
WBC # BLD: 3 THOU/MM3 (ref 4.8–10.8)

## 2022-09-08 PROCEDURE — 90677 PCV20 VACCINE IM: CPT | Performed by: FAMILY MEDICINE

## 2022-09-08 PROCEDURE — 3017F COLORECTAL CA SCREEN DOC REV: CPT | Performed by: FAMILY MEDICINE

## 2022-09-08 PROCEDURE — G8417 CALC BMI ABV UP PARAM F/U: HCPCS | Performed by: FAMILY MEDICINE

## 2022-09-08 PROCEDURE — 99214 OFFICE O/P EST MOD 30 MIN: CPT | Performed by: FAMILY MEDICINE

## 2022-09-08 PROCEDURE — 1123F ACP DISCUSS/DSCN MKR DOCD: CPT | Performed by: FAMILY MEDICINE

## 2022-09-08 PROCEDURE — 90471 IMMUNIZATION ADMIN: CPT | Performed by: FAMILY MEDICINE

## 2022-09-08 PROCEDURE — G8427 DOCREV CUR MEDS BY ELIG CLIN: HCPCS | Performed by: FAMILY MEDICINE

## 2022-09-08 PROCEDURE — 1036F TOBACCO NON-USER: CPT | Performed by: FAMILY MEDICINE

## 2022-09-08 RX ORDER — ENALAPRIL MALEATE 10 MG/1
10 TABLET ORAL 2 TIMES DAILY
Qty: 60 TABLET | Refills: 11 | Status: SHIPPED | OUTPATIENT
Start: 2022-09-08

## 2022-09-08 RX ORDER — LEVOTHYROXINE SODIUM 0.12 MG/1
TABLET ORAL
Qty: 30 TABLET | Refills: 11 | Status: SHIPPED | OUTPATIENT
Start: 2022-09-08

## 2022-09-08 RX ORDER — FENOFIBRATE 160 MG/1
160 TABLET ORAL DAILY
Qty: 30 TABLET | Refills: 11 | Status: SHIPPED | OUTPATIENT
Start: 2022-09-08

## 2022-09-08 SDOH — ECONOMIC STABILITY: FOOD INSECURITY: WITHIN THE PAST 12 MONTHS, YOU WORRIED THAT YOUR FOOD WOULD RUN OUT BEFORE YOU GOT MONEY TO BUY MORE.: NEVER TRUE

## 2022-09-08 SDOH — ECONOMIC STABILITY: FOOD INSECURITY: WITHIN THE PAST 12 MONTHS, THE FOOD YOU BOUGHT JUST DIDN'T LAST AND YOU DIDN'T HAVE MONEY TO GET MORE.: NEVER TRUE

## 2022-09-08 ASSESSMENT — ENCOUNTER SYMPTOMS
EYE PAIN: 0
NAUSEA: 0
VOMITING: 0
ABDOMINAL PAIN: 0
SORE THROAT: 0
RHINORRHEA: 0
BACK PAIN: 0
WHEEZING: 0
DIARRHEA: 0
BLOOD IN STOOL: 0
CHEST TIGHTNESS: 0
CONSTIPATION: 0
COUGH: 0
SHORTNESS OF BREATH: 0

## 2022-09-08 ASSESSMENT — SOCIAL DETERMINANTS OF HEALTH (SDOH): HOW HARD IS IT FOR YOU TO PAY FOR THE VERY BASICS LIKE FOOD, HOUSING, MEDICAL CARE, AND HEATING?: NOT HARD AT ALL

## 2022-09-08 NOTE — PROGRESS NOTES
Blood work drawn today in the office, venous puncture, right arm, pt tolerated well.       Immunizations Administered       Name Date Dose Route    Pneumococcal conjugate PCV20, PF (Prevnar 20) 9/8/2022 0.5 mL Intramuscular    Site: Deltoid- Left    Lot: [id number]    NDC: 6864-5847-56

## 2022-09-08 NOTE — PROGRESS NOTES
Kirsten Gilbert (:  1955) is a 73 y.o. male,Established patient, here for evaluation of the following chief complaint(s):  Medication Refill         ASSESSMENT/PLAN:  1. Need for pneumococcal vaccination  -     Pneumococcal, PCV20, PREVNAR 21, (age 25 yrs+), IM, PF  2. Hypertriglyceridemia  -     fenofibrate (TRIGLIDE) 160 MG tablet; Take 1 tablet by mouth daily, Disp-30 tablet, R-11Normal  -     Lipid Panel; Future  -     CBC with Auto Differential; Future  -     Comprehensive Metabolic Panel; Future  3. Essential hypertension  -     enalapril (VASOTEC) 10 MG tablet; Take 1 tablet by mouth 2 times daily, Disp-60 tablet, R-11Normal  -     CBC with Auto Differential; Future  -     Comprehensive Metabolic Panel; Future  4. Depression, unspecified depression type  -     sertraline (ZOLOFT) 50 MG tablet; TAKE 1 TABLET BY MOUTH DAILY. , Disp-30 tablet, R-11Normal  -     CBC with Auto Differential; Future  -     Comprehensive Metabolic Panel; Future  5. Hypothyroidism, unspecified type  -     levothyroxine (SYNTHROID) 125 MCG tablet; TAKE 1 TABLET BY MOUTH DAILY, Disp-30 tablet, R-11Normal  -     T4, Free; Future  -     TSH; Future  6. Nocturia  -     PSA, Prostatic Specific Antigen; Future  7. Major depressive disorder, recurrent, moderate  -stable, controlled on zoloft  Encouraged diet, exercise and weight loss. Reviewed health maintenance    No follow-ups on file. Subjective   SUBJECTIVE/OBJECTIVE:  HPI  Patient here today for a check up. Reviewed BMI of 33. Encouraged diet, exercise and weight loss. Here for annual exam and med refills. Needs blood work. Overall feeling well, some increased fatigue. , non smoke,r, pmh reviewed. Review of Systems   Constitutional:  Positive for fatigue. Negative for chills, fever and unexpected weight change. HENT:  Negative for congestion, ear pain, rhinorrhea and sore throat. Eyes:  Negative for pain and visual disturbance.    Respiratory: Negative for cough, chest tightness, shortness of breath and wheezing. Cardiovascular:  Negative for chest pain and palpitations. Gastrointestinal:  Negative for abdominal pain, blood in stool, constipation, diarrhea, nausea and vomiting. Genitourinary:  Negative for difficulty urinating, frequency, hematuria and urgency. Musculoskeletal:  Negative for back pain, joint swelling, myalgias and neck pain. Skin:  Negative for rash. Neurological:  Negative for dizziness and headaches. Hematological:  Negative for adenopathy. Does not bruise/bleed easily. Psychiatric/Behavioral:  Negative for behavioral problems and sleep disturbance. The patient is not nervous/anxious. Objective   Physical Exam  Vitals and nursing note reviewed. Constitutional:       Appearance: He is well-developed. HENT:      Head: Normocephalic and atraumatic. Right Ear: External ear normal.      Left Ear: External ear normal.      Nose: Nose normal.      Mouth/Throat:      Mouth: Mucous membranes are moist.   Eyes:      Pupils: Pupils are equal, round, and reactive to light. Neck:      Thyroid: No thyromegaly. Vascular: No carotid bruit. Cardiovascular:      Rate and Rhythm: Normal rate and regular rhythm. Heart sounds: Normal heart sounds. Pulmonary:      Breath sounds: Normal breath sounds. No wheezing or rales. Abdominal:      General: Bowel sounds are normal.      Palpations: Abdomen is soft. Tenderness: There is no abdominal tenderness. There is no guarding or rebound. Musculoskeletal:         General: Normal range of motion. Cervical back: Neck supple. Lymphadenopathy:      Cervical: No cervical adenopathy. Skin:     General: Skin is warm and dry. Findings: No rash. Neurological:      Mental Status: He is alert and oriented to person, place, and time. Cranial Nerves: No cranial nerve deficit. Deep Tendon Reflexes: Reflexes are normal and symmetric. An electronic signature was used to authenticate this note.     --Tabby Arnold MD

## 2022-09-09 ENCOUNTER — TELEPHONE (OUTPATIENT)
Dept: FAMILY MEDICINE CLINIC | Age: 67
End: 2022-09-09

## 2022-09-09 LAB — PROSTATE SPECIFIC ANTIGEN: 0.95 NG/ML (ref 0–1)

## 2022-09-09 NOTE — TELEPHONE ENCOUNTER
----- Message from Bishop Youngblood MD sent at 9/9/2022  6:39 AM EDT -----  Cholesterol at 113, stable. CBC is also stable, continue present.

## 2022-09-09 NOTE — TELEPHONE ENCOUNTER
----- Message from Tabby Arnold MD sent at 9/9/2022  6:30 AM EDT -----  Psa is good. Thyroids are ok. CMP is ok.   Cholesterol at 113,,,,

## 2023-09-11 DIAGNOSIS — I10 ESSENTIAL HYPERTENSION: ICD-10-CM

## 2023-09-11 RX ORDER — ENALAPRIL MALEATE 10 MG/1
10 TABLET ORAL 2 TIMES DAILY
Qty: 10 TABLET | Refills: 0 | Status: SHIPPED | OUTPATIENT
Start: 2023-09-11 | End: 2023-09-14 | Stop reason: SDUPTHER

## 2023-09-11 NOTE — TELEPHONE ENCOUNTER
----- Message from Chrissy Officer sent at 9/11/2023  1:20 PM EDT -----  Subject: Refill Request    QUESTIONS  Name of Medication? enalapril (VASOTEC) 10 MG tablet  Patient-reported dosage and instructions? Take 1 tablet by mouth 2 times   daily 10 mg  How many days do you have left? 0  Preferred Pharmacy? [address] phone number (if available)? [phone]  ---------------------------------------------------------------------------  --------------  CALL BACK INFO  What is the best way for the office to contact you? OK to leave message on   voicemail  Preferred Call Back Phone Number? [phone]  ---------------------------------------------------------------------------  --------------  SCRIPT ANSWERS  Relationship to Patient? Spouse/Partner  Representative Name? Jodi Sims  Is the representative on the Communication Release of Information (MINNIE)   form in Epic?  Yes

## 2023-09-11 NOTE — TELEPHONE ENCOUNTER
Nito Thomas called requesting a refill on the following medications:  Requested Prescriptions     Pending Prescriptions Disp Refills    enalapril (VASOTEC) 10 MG tablet 7 tablet 0     Sig: Take 1 tablet by mouth 2 times daily       Date of last visit: 9/8/2022  Date of next visit (if applicable):9/14/2023  Date of last refill: 09/08/2022  Pharmacy Name: Denita Vinson CMA ([facility])    Pt needs short term supply to [place]

## 2023-09-13 SDOH — HEALTH STABILITY: PHYSICAL HEALTH: ON AVERAGE, HOW MANY MINUTES DO YOU ENGAGE IN EXERCISE AT THIS LEVEL?: 0 MIN

## 2023-09-13 SDOH — ECONOMIC STABILITY: FOOD INSECURITY: WITHIN THE PAST 12 MONTHS, YOU WORRIED THAT YOUR FOOD WOULD RUN OUT BEFORE YOU GOT MONEY TO BUY MORE.: NEVER TRUE

## 2023-09-13 SDOH — ECONOMIC STABILITY: FOOD INSECURITY: WITHIN THE PAST 12 MONTHS, THE FOOD YOU BOUGHT JUST DIDN'T LAST AND YOU DIDN'T HAVE MONEY TO GET MORE.: NEVER TRUE

## 2023-09-13 SDOH — ECONOMIC STABILITY: HOUSING INSECURITY
IN THE LAST 12 MONTHS, WAS THERE A TIME WHEN YOU DID NOT HAVE A STEADY PLACE TO SLEEP OR SLEPT IN A SHELTER (INCLUDING NOW)?: NO

## 2023-09-13 SDOH — ECONOMIC STABILITY: INCOME INSECURITY: HOW HARD IS IT FOR YOU TO PAY FOR THE VERY BASICS LIKE FOOD, HOUSING, MEDICAL CARE, AND HEATING?: NOT HARD AT ALL

## 2023-09-13 SDOH — HEALTH STABILITY: PHYSICAL HEALTH: ON AVERAGE, HOW MANY DAYS PER WEEK DO YOU ENGAGE IN MODERATE TO STRENUOUS EXERCISE (LIKE A BRISK WALK)?: 0 DAYS

## 2023-09-13 SDOH — ECONOMIC STABILITY: TRANSPORTATION INSECURITY
IN THE PAST 12 MONTHS, HAS LACK OF TRANSPORTATION KEPT YOU FROM MEETINGS, WORK, OR FROM GETTING THINGS NEEDED FOR DAILY LIVING?: NO

## 2023-09-13 ASSESSMENT — LIFESTYLE VARIABLES
HOW MANY STANDARD DRINKS CONTAINING ALCOHOL DO YOU HAVE ON A TYPICAL DAY: 0
HOW OFTEN DO YOU HAVE A DRINK CONTAINING ALCOHOL: NEVER
HOW MANY STANDARD DRINKS CONTAINING ALCOHOL DO YOU HAVE ON A TYPICAL DAY: PATIENT DOES NOT DRINK
HOW OFTEN DO YOU HAVE SIX OR MORE DRINKS ON ONE OCCASION: 1
HOW OFTEN DO YOU HAVE A DRINK CONTAINING ALCOHOL: 1

## 2023-09-13 ASSESSMENT — PATIENT HEALTH QUESTIONNAIRE - PHQ9
SUM OF ALL RESPONSES TO PHQ QUESTIONS 1-9: 2
SUM OF ALL RESPONSES TO PHQ QUESTIONS 1-9: 2
SUM OF ALL RESPONSES TO PHQ9 QUESTIONS 1 & 2: 2
SUM OF ALL RESPONSES TO PHQ QUESTIONS 1-9: 2
2. FEELING DOWN, DEPRESSED OR HOPELESS: 1
SUM OF ALL RESPONSES TO PHQ QUESTIONS 1-9: 2
1. LITTLE INTEREST OR PLEASURE IN DOING THINGS: 1

## 2023-09-14 ENCOUNTER — OFFICE VISIT (OUTPATIENT)
Dept: FAMILY MEDICINE CLINIC | Age: 68
End: 2023-09-14
Payer: MEDICARE

## 2023-09-14 VITALS
OXYGEN SATURATION: 95 % | DIASTOLIC BLOOD PRESSURE: 72 MMHG | HEIGHT: 70 IN | TEMPERATURE: 97.4 F | WEIGHT: 248.2 LBS | RESPIRATION RATE: 18 BRPM | HEART RATE: 73 BPM | BODY MASS INDEX: 35.53 KG/M2 | SYSTOLIC BLOOD PRESSURE: 124 MMHG

## 2023-09-14 DIAGNOSIS — H54.3 BLINDNESS OF BOTH EYES: ICD-10-CM

## 2023-09-14 DIAGNOSIS — R35.1 NOCTURIA: ICD-10-CM

## 2023-09-14 DIAGNOSIS — Z00.00 MEDICARE ANNUAL WELLNESS VISIT, SUBSEQUENT: Primary | ICD-10-CM

## 2023-09-14 DIAGNOSIS — G40.909 SEIZURE DISORDER (HCC): ICD-10-CM

## 2023-09-14 DIAGNOSIS — F33.1 MAJOR DEPRESSIVE DISORDER, RECURRENT, MODERATE (HCC): ICD-10-CM

## 2023-09-14 DIAGNOSIS — E78.1 HYPERTRIGLYCERIDEMIA: ICD-10-CM

## 2023-09-14 DIAGNOSIS — E66.01 SEVERE OBESITY (BMI 35.0-39.9) WITH COMORBIDITY (HCC): ICD-10-CM

## 2023-09-14 DIAGNOSIS — E03.9 HYPOTHYROIDISM, UNSPECIFIED TYPE: ICD-10-CM

## 2023-09-14 DIAGNOSIS — D69.6 THROMBOCYTOPENIA (HCC): ICD-10-CM

## 2023-09-14 DIAGNOSIS — I10 ESSENTIAL HYPERTENSION: ICD-10-CM

## 2023-09-14 DIAGNOSIS — F32.A DEPRESSION, UNSPECIFIED DEPRESSION TYPE: ICD-10-CM

## 2023-09-14 LAB
ALBUMIN SERPL BCG-MCNC: 4 G/DL (ref 3.5–5.1)
ALP SERPL-CCNC: 38 U/L (ref 38–126)
ALT SERPL W/O P-5'-P-CCNC: 16 U/L (ref 11–66)
ANION GAP SERPL CALC-SCNC: 10 MEQ/L (ref 8–16)
ANISOCYTOSIS BLD QL SMEAR: PRESENT
AST SERPL-CCNC: 35 U/L (ref 5–40)
BASOPHILS ABSOLUTE: 0 THOU/MM3 (ref 0–0.1)
BASOPHILS NFR BLD AUTO: 1.1 %
BILIRUB SERPL-MCNC: 0.5 MG/DL (ref 0.3–1.2)
BUN SERPL-MCNC: 23 MG/DL (ref 7–22)
CALCIUM SERPL-MCNC: 9.3 MG/DL (ref 8.5–10.5)
CHLORIDE SERPL-SCNC: 104 MEQ/L (ref 98–111)
CHOLEST SERPL-MCNC: 132 MG/DL (ref 100–199)
CO2 SERPL-SCNC: 25 MEQ/L (ref 23–33)
CREAT SERPL-MCNC: 0.9 MG/DL (ref 0.4–1.2)
DEPRECATED RDW RBC AUTO: 43.5 FL (ref 35–45)
EOSINOPHIL NFR BLD AUTO: 3.6 %
EOSINOPHILS ABSOLUTE: 0.1 THOU/MM3 (ref 0–0.4)
ERYTHROCYTE [DISTWIDTH] IN BLOOD BY AUTOMATED COUNT: 13.4 % (ref 11.5–14.5)
GFR SERPL CREATININE-BSD FRML MDRD: > 60 ML/MIN/1.73M2
GLUCOSE SERPL-MCNC: 118 MG/DL (ref 70–108)
HCT VFR BLD AUTO: 39.9 % (ref 42–52)
HDLC SERPL-MCNC: 26 MG/DL
HGB BLD-MCNC: 13.9 GM/DL (ref 14–18)
IMM GRANULOCYTES # BLD AUTO: 0 THOU/MM3 (ref 0–0.07)
IMM GRANULOCYTES NFR BLD AUTO: 0 %
LDLC SERPL CALC-MCNC: 59 MG/DL
LYMPHOCYTES ABSOLUTE: 1.1 THOU/MM3 (ref 1–4.8)
LYMPHOCYTES NFR BLD AUTO: 40.2 %
MCH RBC QN AUTO: 31 PG (ref 26–33)
MCHC RBC AUTO-ENTMCNC: 34.8 GM/DL (ref 32.2–35.5)
MCV RBC AUTO: 88.9 FL (ref 80–94)
MONOCYTES ABSOLUTE: 0.3 THOU/MM3 (ref 0.4–1.3)
MONOCYTES NFR BLD AUTO: 10.5 %
NEUTROPHILS NFR BLD AUTO: 44.6 %
NRBC BLD AUTO-RTO: 0 /100 WBC
PLATELET # BLD AUTO: 108 THOU/MM3 (ref 130–400)
PLATELET BLD QL SMEAR: ABNORMAL
PMV BLD AUTO: 11.8 FL (ref 9.4–12.4)
POTASSIUM SERPL-SCNC: 4 MEQ/L (ref 3.5–5.2)
PROT SERPL-MCNC: 6.5 G/DL (ref 6.1–8)
RBC # BLD AUTO: 4.49 MILL/MM3 (ref 4.7–6.1)
SCAN OF BLOOD SMEAR: NORMAL
SEGMENTED NEUTROPHILS ABSOLUTE COUNT: 1.2 THOU/MM3 (ref 1.8–7.7)
SODIUM SERPL-SCNC: 139 MEQ/L (ref 135–145)
T4 FREE SERPL-MCNC: 0.93 NG/DL (ref 0.93–1.76)
TRIGL SERPL-MCNC: 235 MG/DL (ref 0–199)
TSH SERPL DL<=0.005 MIU/L-ACNC: 0.01 UIU/ML (ref 0.4–4.2)
WBC # BLD AUTO: 2.8 THOU/MM3 (ref 4.8–10.8)

## 2023-09-14 PROCEDURE — 3017F COLORECTAL CA SCREEN DOC REV: CPT | Performed by: FAMILY MEDICINE

## 2023-09-14 PROCEDURE — G0439 PPPS, SUBSEQ VISIT: HCPCS | Performed by: FAMILY MEDICINE

## 2023-09-14 PROCEDURE — 3078F DIAST BP <80 MM HG: CPT | Performed by: FAMILY MEDICINE

## 2023-09-14 PROCEDURE — 3074F SYST BP LT 130 MM HG: CPT | Performed by: FAMILY MEDICINE

## 2023-09-14 PROCEDURE — 1123F ACP DISCUSS/DSCN MKR DOCD: CPT | Performed by: FAMILY MEDICINE

## 2023-09-14 RX ORDER — FENOFIBRATE 160 MG/1
160 TABLET ORAL DAILY
Qty: 30 TABLET | Refills: 11 | Status: SHIPPED | OUTPATIENT
Start: 2023-09-14

## 2023-09-14 RX ORDER — ENALAPRIL MALEATE 10 MG/1
10 TABLET ORAL 2 TIMES DAILY
Qty: 60 TABLET | Refills: 11 | Status: SHIPPED | OUTPATIENT
Start: 2023-09-14

## 2023-09-14 RX ORDER — LEVOTHYROXINE SODIUM 0.12 MG/1
TABLET ORAL
Qty: 30 TABLET | Refills: 11 | Status: SHIPPED | OUTPATIENT
Start: 2023-09-14

## 2023-09-14 ASSESSMENT — PATIENT HEALTH QUESTIONNAIRE - PHQ9
SUM OF ALL RESPONSES TO PHQ QUESTIONS 1-9: 2
SUM OF ALL RESPONSES TO PHQ QUESTIONS 1-9: 9
10. IF YOU CHECKED OFF ANY PROBLEMS, HOW DIFFICULT HAVE THESE PROBLEMS MADE IT FOR YOU TO DO YOUR WORK, TAKE CARE OF THINGS AT HOME, OR GET ALONG WITH OTHER PEOPLE: 2
SUM OF ALL RESPONSES TO PHQ QUESTIONS 1-9: 2
5. POOR APPETITE OR OVEREATING: 0
5. POOR APPETITE OR OVEREATING: 0
8. MOVING OR SPEAKING SO SLOWLY THAT OTHER PEOPLE COULD HAVE NOTICED. OR THE OPPOSITE, BEING SO FIGETY OR RESTLESS THAT YOU HAVE BEEN MOVING AROUND A LOT MORE THAN USUAL: 0
SUM OF ALL RESPONSES TO PHQ QUESTIONS 1-9: 9
6. FEELING BAD ABOUT YOURSELF - OR THAT YOU ARE A FAILURE OR HAVE LET YOURSELF OR YOUR FAMILY DOWN: 1
7. TROUBLE CONCENTRATING ON THINGS, SUCH AS READING THE NEWSPAPER OR WATCHING TELEVISION: 1
1. LITTLE INTEREST OR PLEASURE IN DOING THINGS: 1
SUM OF ALL RESPONSES TO PHQ QUESTIONS 1-9: 2
SUM OF ALL RESPONSES TO PHQ QUESTIONS 1-9: 2
4. FEELING TIRED OR HAVING LITTLE ENERGY: 0
SUM OF ALL RESPONSES TO PHQ QUESTIONS 1-9: 9
8. MOVING OR SPEAKING SO SLOWLY THAT OTHER PEOPLE COULD HAVE NOTICED. OR THE OPPOSITE, BEING SO FIGETY OR RESTLESS THAT YOU HAVE BEEN MOVING AROUND A LOT MORE THAN USUAL: 1
4. FEELING TIRED OR HAVING LITTLE ENERGY: 3
1. LITTLE INTEREST OR PLEASURE IN DOING THINGS: 1
2. FEELING DOWN, DEPRESSED OR HOPELESS: 1
SUM OF ALL RESPONSES TO PHQ QUESTIONS 1-9: 9
10. IF YOU CHECKED OFF ANY PROBLEMS, HOW DIFFICULT HAVE THESE PROBLEMS MADE IT FOR YOU TO DO YOUR WORK, TAKE CARE OF THINGS AT HOME, OR GET ALONG WITH OTHER PEOPLE: 1
7. TROUBLE CONCENTRATING ON THINGS, SUCH AS READING THE NEWSPAPER OR WATCHING TELEVISION: 0
2. FEELING DOWN, DEPRESSED OR HOPELESS: 1
3. TROUBLE FALLING OR STAYING ASLEEP: 0
SUM OF ALL RESPONSES TO PHQ9 QUESTIONS 1 & 2: 2
3. TROUBLE FALLING OR STAYING ASLEEP: 1
6. FEELING BAD ABOUT YOURSELF - OR THAT YOU ARE A FAILURE OR HAVE LET YOURSELF OR YOUR FAMILY DOWN: 0
SUM OF ALL RESPONSES TO PHQ9 QUESTIONS 1 & 2: 2
9. THOUGHTS THAT YOU WOULD BE BETTER OFF DEAD, OR OF HURTING YOURSELF: 0

## 2023-09-14 NOTE — PROGRESS NOTES
Blood work drawn today in the office, venous puncture, right arm, pt tolerated well.
IU  Yes Historical Provider, MD   Famotidine (PEPCID AC PO) Take 20 mg by mouth. Yes Historical Provider, MD   Handicap Placard MISC by Does not apply route Request parking placard due to medical conditions. Duration of 5 years.   Roberta Osborne MD       CareTe (Including outside providers/suppliers regularly involved in providing care):   Patient Care Team:  Roberta Osborne MD as PCP - General (Family Medicine)  Roberta Osborne MD as PCP - Empaneled Provider     Reviewed and updated this visit:  Tobacco  Allergies  Meds  Problems  Med Hx  Surg Hx  Soc Hx  Fam Hx

## 2023-09-15 ENCOUNTER — TELEPHONE (OUTPATIENT)
Dept: FAMILY MEDICINE CLINIC | Age: 68
End: 2023-09-15

## 2023-09-15 LAB — PSA SERPL-MCNC: 0.62 NG/ML (ref 0–1)

## 2024-09-26 ENCOUNTER — OFFICE VISIT (OUTPATIENT)
Dept: FAMILY MEDICINE CLINIC | Age: 69
End: 2024-09-26

## 2024-09-26 VITALS
SYSTOLIC BLOOD PRESSURE: 116 MMHG | HEIGHT: 70 IN | BODY MASS INDEX: 33.16 KG/M2 | RESPIRATION RATE: 18 BRPM | DIASTOLIC BLOOD PRESSURE: 66 MMHG | TEMPERATURE: 96.9 F | WEIGHT: 231.6 LBS | OXYGEN SATURATION: 96 % | HEART RATE: 70 BPM

## 2024-09-26 DIAGNOSIS — Z00.00 MEDICARE ANNUAL WELLNESS VISIT, SUBSEQUENT: Primary | ICD-10-CM

## 2024-09-26 DIAGNOSIS — E03.9 HYPOTHYROIDISM, UNSPECIFIED TYPE: ICD-10-CM

## 2024-09-26 DIAGNOSIS — I10 ESSENTIAL HYPERTENSION: ICD-10-CM

## 2024-09-26 DIAGNOSIS — E78.1 HYPERTRIGLYCERIDEMIA: ICD-10-CM

## 2024-09-26 DIAGNOSIS — G40.909 SEIZURE DISORDER (HCC): ICD-10-CM

## 2024-09-26 DIAGNOSIS — F32.A DEPRESSION, UNSPECIFIED DEPRESSION TYPE: ICD-10-CM

## 2024-09-26 DIAGNOSIS — F33.1 MAJOR DEPRESSIVE DISORDER, RECURRENT, MODERATE (HCC): ICD-10-CM

## 2024-09-26 DIAGNOSIS — R35.1 NOCTURIA: ICD-10-CM

## 2024-09-26 DIAGNOSIS — D69.6 THROMBOCYTOPENIA (HCC): ICD-10-CM

## 2024-09-26 PROBLEM — G47.33 OBSTRUCTIVE SLEEP APNEA SYNDROME: Status: ACTIVE | Noted: 2024-09-26

## 2024-09-26 LAB
ALBUMIN SERPL BCG-MCNC: 4.1 G/DL (ref 3.5–5.1)
ALP SERPL-CCNC: 35 U/L (ref 38–126)
ALT SERPL W/O P-5'-P-CCNC: 12 U/L (ref 11–66)
ANION GAP SERPL CALC-SCNC: 11 MEQ/L (ref 8–16)
AST SERPL-CCNC: 29 U/L (ref 5–40)
BASOPHILS ABSOLUTE: 0 THOU/MM3 (ref 0–0.1)
BASOPHILS NFR BLD AUTO: 0.7 %
BILIRUB SERPL-MCNC: 0.6 MG/DL (ref 0.3–1.2)
BUN SERPL-MCNC: 22 MG/DL (ref 7–22)
CALCIUM SERPL-MCNC: 9.3 MG/DL (ref 8.5–10.5)
CHLORIDE SERPL-SCNC: 101 MEQ/L (ref 98–111)
CHOLEST SERPL-MCNC: 130 MG/DL (ref 100–199)
CO2 SERPL-SCNC: 24 MEQ/L (ref 23–33)
CREAT SERPL-MCNC: 0.9 MG/DL (ref 0.4–1.2)
DEPRECATED RDW RBC AUTO: 41.1 FL (ref 35–45)
EOSINOPHIL NFR BLD AUTO: 2.4 %
EOSINOPHILS ABSOLUTE: 0.1 THOU/MM3 (ref 0–0.4)
ERYTHROCYTE [DISTWIDTH] IN BLOOD BY AUTOMATED COUNT: 12.9 % (ref 11.5–14.5)
GFR SERPL CREATININE-BSD FRML MDRD: > 90 ML/MIN/1.73M2
GLUCOSE SERPL-MCNC: 106 MG/DL (ref 70–108)
HCT VFR BLD AUTO: 38.6 % (ref 42–52)
HDLC SERPL-MCNC: 26 MG/DL
HGB BLD-MCNC: 13.5 GM/DL (ref 14–18)
IMM GRANULOCYTES # BLD AUTO: 0.01 THOU/MM3 (ref 0–0.07)
IMM GRANULOCYTES NFR BLD AUTO: 0.3 %
LDLC SERPL CALC-MCNC: 71 MG/DL
LYMPHOCYTES ABSOLUTE: 1.2 THOU/MM3 (ref 1–4.8)
LYMPHOCYTES NFR BLD AUTO: 41.5 %
MCH RBC QN AUTO: 30.8 PG (ref 26–33)
MCHC RBC AUTO-ENTMCNC: 35 GM/DL (ref 32.2–35.5)
MCV RBC AUTO: 87.9 FL (ref 80–94)
MONOCYTES ABSOLUTE: 0.3 THOU/MM3 (ref 0.4–1.3)
MONOCYTES NFR BLD AUTO: 9.4 %
NEUTROPHILS ABSOLUTE: 1.3 THOU/MM3 (ref 1.8–7.7)
NEUTROPHILS NFR BLD AUTO: 45.7 %
NRBC BLD AUTO-RTO: 0 /100 WBC
PLATELET # BLD AUTO: 104 THOU/MM3 (ref 130–400)
PMV BLD AUTO: 11.2 FL (ref 9.4–12.4)
POTASSIUM SERPL-SCNC: 3.8 MEQ/L (ref 3.5–5.2)
PROT SERPL-MCNC: 6.7 G/DL (ref 6.1–8)
RBC # BLD AUTO: 4.39 MILL/MM3 (ref 4.7–6.1)
SODIUM SERPL-SCNC: 136 MEQ/L (ref 135–145)
T4 FREE SERPL-MCNC: 1.16 NG/DL (ref 0.93–1.68)
TRIGL SERPL-MCNC: 163 MG/DL (ref 0–199)
TSH SERPL DL<=0.005 MIU/L-ACNC: 0.02 UIU/ML (ref 0.4–4.2)
WBC # BLD AUTO: 2.9 THOU/MM3 (ref 4.8–10.8)

## 2024-09-26 RX ORDER — FENOFIBRATE 160 MG/1
160 TABLET ORAL DAILY
Qty: 30 TABLET | Refills: 11 | Status: SHIPPED | OUTPATIENT
Start: 2024-09-26

## 2024-09-26 RX ORDER — ENALAPRIL MALEATE 10 MG/1
10 TABLET ORAL 2 TIMES DAILY
Qty: 60 TABLET | Refills: 11 | Status: SHIPPED | OUTPATIENT
Start: 2024-09-26

## 2024-09-26 RX ORDER — LEVOTHYROXINE SODIUM 125 UG/1
TABLET ORAL
Qty: 30 TABLET | Refills: 11 | Status: SHIPPED | OUTPATIENT
Start: 2024-09-26

## 2024-09-26 SDOH — ECONOMIC STABILITY: INCOME INSECURITY: HOW HARD IS IT FOR YOU TO PAY FOR THE VERY BASICS LIKE FOOD, HOUSING, MEDICAL CARE, AND HEATING?: NOT HARD AT ALL

## 2024-09-26 SDOH — ECONOMIC STABILITY: FOOD INSECURITY: WITHIN THE PAST 12 MONTHS, THE FOOD YOU BOUGHT JUST DIDN'T LAST AND YOU DIDN'T HAVE MONEY TO GET MORE.: NEVER TRUE

## 2024-09-26 SDOH — ECONOMIC STABILITY: FOOD INSECURITY: WITHIN THE PAST 12 MONTHS, YOU WORRIED THAT YOUR FOOD WOULD RUN OUT BEFORE YOU GOT MONEY TO BUY MORE.: NEVER TRUE

## 2024-09-26 ASSESSMENT — PATIENT HEALTH QUESTIONNAIRE - PHQ9
SUM OF ALL RESPONSES TO PHQ QUESTIONS 1-9: 4
SUM OF ALL RESPONSES TO PHQ QUESTIONS 1-9: 4
1. LITTLE INTEREST OR PLEASURE IN DOING THINGS: SEVERAL DAYS
4. FEELING TIRED OR HAVING LITTLE ENERGY: NOT AT ALL
2. FEELING DOWN, DEPRESSED OR HOPELESS: SEVERAL DAYS
7. TROUBLE CONCENTRATING ON THINGS, SUCH AS READING THE NEWSPAPER OR WATCHING TELEVISION: NOT AT ALL
5. POOR APPETITE OR OVEREATING: NOT AT ALL
8. MOVING OR SPEAKING SO SLOWLY THAT OTHER PEOPLE COULD HAVE NOTICED. OR THE OPPOSITE, BEING SO FIGETY OR RESTLESS THAT YOU HAVE BEEN MOVING AROUND A LOT MORE THAN USUAL: SEVERAL DAYS
SUM OF ALL RESPONSES TO PHQ9 QUESTIONS 1 & 2: 2
10. IF YOU CHECKED OFF ANY PROBLEMS, HOW DIFFICULT HAVE THESE PROBLEMS MADE IT FOR YOU TO DO YOUR WORK, TAKE CARE OF THINGS AT HOME, OR GET ALONG WITH OTHER PEOPLE: SOMEWHAT DIFFICULT
SUM OF ALL RESPONSES TO PHQ QUESTIONS 1-9: 4
6. FEELING BAD ABOUT YOURSELF - OR THAT YOU ARE A FAILURE OR HAVE LET YOURSELF OR YOUR FAMILY DOWN: SEVERAL DAYS
3. TROUBLE FALLING OR STAYING ASLEEP: NOT AT ALL
9. THOUGHTS THAT YOU WOULD BE BETTER OFF DEAD, OR OF HURTING YOURSELF: NOT AT ALL
SUM OF ALL RESPONSES TO PHQ QUESTIONS 1-9: 4

## 2024-09-26 ASSESSMENT — LIFESTYLE VARIABLES
HOW MANY STANDARD DRINKS CONTAINING ALCOHOL DO YOU HAVE ON A TYPICAL DAY: PATIENT DOES NOT DRINK
HOW OFTEN DO YOU HAVE A DRINK CONTAINING ALCOHOL: NEVER

## 2024-09-27 ENCOUNTER — TELEPHONE (OUTPATIENT)
Dept: FAMILY MEDICINE CLINIC | Age: 69
End: 2024-09-27

## 2024-09-27 LAB — PSA SERPL-MCNC: 0.78 NG/ML (ref 0–1)

## 2025-08-25 DIAGNOSIS — I10 ESSENTIAL HYPERTENSION: ICD-10-CM

## 2025-08-26 RX ORDER — ENALAPRIL MALEATE 10 MG/1
TABLET ORAL
Qty: 60 TABLET | Refills: 11 | OUTPATIENT
Start: 2025-08-26

## (undated) DEVICE — GLOVE SURG SZ 65 THK91MIL LTX FREE SYN POLYISOPRENE

## (undated) DEVICE — GAUZE,SPONGE,8"X4",12PLY,XRAY,STRL,LF: Brand: MEDLINE

## (undated) DEVICE — CHLORAPREP 26ML ORANGE

## (undated) DEVICE — PADDING,UNDERCAST,COTTON, 4"X4YD STERILE: Brand: MEDLINE

## (undated) DEVICE — DIGIT TRAP FINGER GRASPING DEVICE: Brand: DIGIT TRAP

## (undated) DEVICE — DRAPE C ARM W36XL30IN RECTANG BND BG AND TAPE

## (undated) DEVICE — COVER ARMBRD W13XL28.5IN IMPERV BLU FOR OP RM

## (undated) DEVICE — GOWN,SIRUS,NONRNF,SETINSLV,XL,20/CS: Brand: MEDLINE

## (undated) DEVICE — Device

## (undated) DEVICE — GLOVE ORANGE PI 8   MSG9080

## (undated) DEVICE — GLOVE ORANGE PI 7   MSG9070

## (undated) DEVICE — SLING ARM L L165IN D75IN WHT POLY MESH ENVELOP MTL SIDE

## (undated) DEVICE — PADDING UNDERCAST W4INXL4YD COT FBR LO LINTING WYTEX

## (undated) DEVICE — TAPE CAST W3INXL4YD RED H2O ACT RESIN KNIT FBRGLS LTWT

## (undated) DEVICE — Z DUP USE 2257490 ADHESIVE SKIN CLSRE 036ML TPCL 2CTL CNCRLTE HIGH VSCSTY DRMB

## (undated) DEVICE — 450 ML BOTTLE OF 0.05% CHLORHEXIDINE GLUCONATE IN 99.95% STERILE WATER FOR IRRIGATION, USP AND APPLICATOR.: Brand: IRRISEPT ANTIMICROBIAL WOUND LAVAGE

## (undated) DEVICE — GOWN,SIRUS,NON REINFRCD,LARGE,SET IN SL: Brand: MEDLINE

## (undated) DEVICE — SPONGE GZ W4XL4IN COT 12 PLY TYP VII WVN C FLD DSGN

## (undated) DEVICE — 4-PORT MANIFOLD: Brand: NEPTUNE 2

## (undated) DEVICE — BANDAGE COBAN 4 IN COMPR W4INXL5YD FOAM COHESIVE QUIK STK SELF ADH SFT